# Patient Record
Sex: MALE | Race: BLACK OR AFRICAN AMERICAN | HISPANIC OR LATINO | Employment: PART TIME | URBAN - METROPOLITAN AREA
[De-identification: names, ages, dates, MRNs, and addresses within clinical notes are randomized per-mention and may not be internally consistent; named-entity substitution may affect disease eponyms.]

---

## 2017-03-03 ENCOUNTER — HOSPITAL ENCOUNTER (EMERGENCY)
Facility: HOSPITAL | Age: 37
Discharge: HOME | End: 2017-03-03
Attending: EMERGENCY MEDICINE
Payer: MEDICAID

## 2017-03-03 VITALS
HEIGHT: 66 IN | OXYGEN SATURATION: 100 % | BODY MASS INDEX: 24.11 KG/M2 | WEIGHT: 150 LBS | SYSTOLIC BLOOD PRESSURE: 183 MMHG | DIASTOLIC BLOOD PRESSURE: 124 MMHG | TEMPERATURE: 98 F | RESPIRATION RATE: 14 BRPM | HEART RATE: 72 BPM

## 2017-03-03 DIAGNOSIS — I10 ESSENTIAL HYPERTENSION: ICD-10-CM

## 2017-03-03 DIAGNOSIS — H60.91 OTITIS EXTERNA OF RIGHT EAR, UNSPECIFIED CHRONICITY, UNSPECIFIED TYPE: ICD-10-CM

## 2017-03-03 DIAGNOSIS — H66.91 RIGHT OTITIS MEDIA, UNSPECIFIED CHRONICITY, UNSPECIFIED OTITIS MEDIA TYPE: Primary | ICD-10-CM

## 2017-03-03 PROCEDURE — 99283 EMERGENCY DEPT VISIT LOW MDM: CPT

## 2017-03-03 RX ORDER — NEOMYCIN SULFATE, POLYMYXIN B SULFATE AND HYDROCORTISONE 10; 3.5; 1 MG/ML; MG/ML; [USP'U]/ML
4 SUSPENSION/ DROPS AURICULAR (OTIC) 3 TIMES DAILY
Qty: 8 ML | Refills: 0 | Status: SHIPPED | OUTPATIENT
Start: 2017-03-03 | End: 2017-03-13

## 2017-03-03 RX ORDER — HYDROCHLOROTHIAZIDE 12.5 MG/1
12.5 TABLET ORAL DAILY
Qty: 30 TABLET | Refills: 0 | Status: SHIPPED | OUTPATIENT
Start: 2017-03-03 | End: 2017-04-22

## 2017-03-03 RX ORDER — AMOXICILLIN AND CLAVULANATE POTASSIUM 875; 125 MG/1; MG/1
1 TABLET, FILM COATED ORAL 2 TIMES DAILY
Qty: 20 TABLET | Refills: 0 | Status: SHIPPED | OUTPATIENT
Start: 2017-03-03 | End: 2017-03-13

## 2017-03-03 NOTE — ED PROVIDER NOTES
"Encounter Date: 3/3/2017    SCRIBE #1 NOTE: I, Matthew Brizuela, am scribing for, and in the presence of,  Daniel Churchill MD. I have scribed the following portions of the note - Other sections scribed: HPI and ROS.       History     Chief Complaint   Patient presents with    Otalgia     PT C/O R EARACHE SINCE LAST NIGHT, STATES SOME GREEN/BROWN DRAINAGE LAST NIGHT.      Review of patient's allergies indicates:  No Known Allergies  HPI Comments: CC: Otalgia     HPI: This 36 y.o M with HTN presents to the ED c/o acute onset of constant and moderate (5/10) R otalgia which began last night. The pt also reports R ear drainage. The pt describes his R ear pain as "thumping." Pt denies headache, fever, and chills. The pt has not taken his HTN Rx this AM. No prior tx.     The history is provided by the patient. No  was used.     Past Medical History:   Diagnosis Date    Hypertension      Past Surgical History:   Procedure Laterality Date    ANKLE SURGERY      TYMPANOSTOMY TUBE PLACEMENT       Family History   Problem Relation Age of Onset    Asthma Mother     Diabetes Father      Social History   Substance Use Topics    Smoking status: Current Some Day Smoker     Types: Cigarettes    Smokeless tobacco: None    Alcohol use Yes      Comment: sometimes     Review of Systems   Constitutional: Negative for chills and fever.   HENT: Positive for ear discharge (R) and ear pain (R).    Respiratory: Negative for cough.    Cardiovascular: Negative for chest pain.   Gastrointestinal: Negative for diarrhea, nausea and vomiting.   Musculoskeletal: Negative for myalgias.   Skin: Negative for rash.   Neurological: Negative for headaches.       Physical Exam   Initial Vitals   BP Pulse Resp Temp SpO2   03/03/17 0612 03/03/17 0612 03/03/17 0612 03/03/17 0612 03/03/17 0612   203/131 70 13 98.2 °F (36.8 °C) 99 %     Physical Exam    Nursing note and vitals reviewed.  Constitutional: He appears well-developed and " well-nourished.   HENT:   Head: Normocephalic and atraumatic.   Erythematous, bulging right tympanic membrane with associated discharge in the external ear canal.   Eyes: EOM are normal. Pupils are equal, round, and reactive to light.   Cardiovascular: Normal rate, regular rhythm, normal heart sounds and intact distal pulses.   Pulmonary/Chest: Breath sounds normal. No respiratory distress. He has no wheezes. He has no rhonchi. He has no rales. He exhibits no tenderness.   Abdominal: Soft. Bowel sounds are normal. He exhibits no distension. There is no tenderness.   Musculoskeletal: Normal range of motion. He exhibits no edema.   Neurological: He is alert and oriented to person, place, and time.   Skin: Skin is warm and dry.         ED Course   Procedures  Labs Reviewed - No data to display            A decision was made to obtain the patient's old medical records.  If they were available, these records were reviewed.  Significant findings include previous evaluations for similar symptoms.  The patient does not have signs or symptoms to suggest acute hypertensive emergency with require emergent lowering of his blood pressure.  He reports noncompliance with his medications.  Exam findings are consistent with otitis media with associated otitis externa.  Discharge with Cortisporin, Augmentin, refill of blood pressure medications.  Primary care follow-up.          Scribe Attestation:   Scribe #1: I performed the above scribed service and the documentation accurately describes the services I performed. I attest to the accuracy of the note.    Attending Attestation:           Physician Attestation for Scribe:  Physician Attestation Statement for Scribe #1: I, Daniel Churchill MD, reviewed documentation, as scribed by Matthew Brizuela in my presence, and it is both accurate and complete.                 ED Course     Clinical Impression:   The primary encounter diagnosis was Right otitis media, unspecified chronicity,  unspecified otitis media type. Diagnoses of Otitis externa of right ear, unspecified chronicity, unspecified type and Essential hypertension were also pertinent to this visit.          Daniel Churchill MD  03/03/17 0610

## 2017-03-03 NOTE — DISCHARGE INSTRUCTIONS
Understanding Middle Ear Infections in Children  Middle ear infections are most common in children under age 5. Crankiness, a fever, and tugging at or rubbing the ear may all be signs that your child has a middle ear infection. This is especially true if your child has a cold or other viral illness. It's important to call your healthcare provider if you see these or any of the signs listed below.  Call your healthcare provider's office if you notice any signs of a middle ear infection.   What are middle ear infections?    Middle ear infections occur behind the eardrum. The eardrum is the thin sheet of tissue that passes sound waves between the outer and middle ear. These infections are usually caused by bacteria or viruses. These are often related to a recent cold or allergy problem.  A blocked tube  In young children, these bacteria or viruses likely reach the middle ear by traveling the short length of the eustachian tube from the back of the nose. Once in the middle ear, they multiply and spread. This irritates delicate tissues lining the middle ear and eustachian tube. If the tube lining swells enough to block off the tube, air pressure drops in the middle ear. This pulls the eardrum inward, making it stiffer and less able to transmit sound.  Fluid buildup causes pain  Once the eustachian tube swells shut, moisture cant drain from the middle ear. Fluid that should flush out the infection builds up in the chamber. This may raise pressure behind the eardrum. This can decrease pain slightly. But if the infection spreads to this fluid, pressure behind the eardrum goes way up. The eardrum is forced outward. It becomes painful, and may break.  Chronic fluid affects hearing  If the eardrum doesnt break and the tube remains blocked, the fluid becomes an ongoing condition (chronic). As the immediate (acute) infection passes, the middle ear fluid thickens. It becomes sticky and takes up less space. Pressure drops in  the middle ear once more. Inward suction stiffens the eardrum. This affects hearing. If the fluid is not removed, the eardrum may be stretched and damaged.  Signs of middle ear problems  · A temperature over 100.4°F (38.0°C) and cold symptoms  · Severe ear pain  · Any kind of discharge from the ear  · Ear pain that gets worse or doesnt go away after a few days   When to call your child's healthcare provider  Call your child's healthcare provider's office if your otherwise healthy child has any of the signs or symptoms described below:  · In an infant under 3 months old, a rectal temperature of 100.4°F (38.0°C) or higher  · In a child of any age who has a repeated temperature of 104°F (40°C) or higher  · A fever that lasts more than 24 hours in a child under 2 years old, or for 3 days in a child 2 years or older  · Your child has had a seizure caused by the fever  · Rapid breathing or shortness of breath  · A stiff neck or headache  · Difficulty swallowing  · Your child acts ill after the fever is gone  · Persistent brown, green, or bloody mucus  · Signs of dehydration. These include severe thirst, dark yellow urine, infrequent urination, dull or sunken eyes, dry skin, and dry or cracked lips.  · Your child still doesn't look or act right to you, even after taking a non-aspirin pain reliever  Date Last Reviewed: 11/1/2016  © 1624-9387 [employer]. [address] [place] [address], [place] [address]. All rights reserved. This information is not intended as a substitute for professional medical care. Always follow your healthcare professional's instructions.

## 2017-03-03 NOTE — ED TRIAGE NOTES
PT C/O RIGHT EARACHE SINCE LAST NIGHT. INCIDENTALLY HAS KG=246/131, STATES HAS NOT TAKEN HIS HCTZ X1WEEK BECAUSE HE NEEDS RX REFILLED.

## 2017-03-03 NOTE — ED AVS SNAPSHOT
[facility]  [place] [id number]               Rodolfo Aamir   3/3/2017  6:16 AM   ED    Description:  Male : 1980   Department:  [facility]           Your Care was Coordinated By:     Provider Role From To    Daniel Churchill MD Attending Provider 17 0618 --      Reason for Visit     Otalgia           Diagnoses this Visit        Comments    Right otitis media, unspecified chronicity, unspecified otitis media type    -  Primary     Otitis externa of right ear, unspecified chronicity, unspecified type         Essential hypertension           ED Disposition     None           To Do List           Follow-up Information     Follow up with Maribell Belcher MD. Schedule an appointment as soon as possible for a visit in 1 week.    Specialty:  Internal Medicine    Why:  As needed    Contact information:    [address] [place] [place] [id number] [phone]         These Medications        Disp Refills Start End    amoxicillin-clavulanate 875-125mg (AUGMENTIN) 875-125 mg per tablet 20 tablet 0 3/3/2017 3/13/2017    Take 1 tablet by mouth 2 (two) times daily. - Oral    neomycin-polymyxin-hydrocortisone (CORTISPORIN) 3.5-10,000-1 mg/mL-unit/mL-% otic suspension 8 mL 0 3/3/2017 3/13/2017    Place 4 drops into the right ear 3 (three) times daily. - Right Ear      [facility]     [facility] On Call Nurse Care Line -  Assistance  Registered nurses in the [facility] On Call Center provide clinical advisement, health education, appointment booking, and other advisory services.  Call for this free service at [phone].             Medications           Message regarding Medications     Verify the changes and/or additions to your medication regime listed below are the same as discussed with your clinician today.  If any of these changes or additions are incorrect, please notify your healthcare provider.        START taking these NEW  "medications        Refills    amoxicillin-clavulanate 875-125mg (AUGMENTIN) 875-125 mg per tablet 0    Sig: Take 1 tablet by mouth 2 (two) times daily.    Class: Print    Route: Oral    neomycin-polymyxin-hydrocortisone (CORTISPORIN) 3.5-10,000-1 mg/mL-unit/mL-% otic suspension 0    Sig: Place 4 drops into the right ear 3 (three) times daily.    Class: Print    Route: Right Ear           Verify that the below list of medications is an accurate representation of the medications you are currently taking.  If none reported, the list may be blank. If incorrect, please contact your healthcare provider. Carry this list with you in case of emergency.           Current Medications     hydrochlorothiazide (HYDRODIURIL) 12.5 MG Tab Take 1 tablet (12.5 mg total) by mouth once daily.    amoxicillin-clavulanate 875-125mg (AUGMENTIN) 875-125 mg per tablet Take 1 tablet by mouth 2 (two) times daily.    hydrocodone-acetaminophen 5-325mg (NORCO) 5-325 mg per tablet Take 1 tablet by mouth every 6 (six) hours as needed for Pain.    naproxen (NAPROSYN) 500 MG tablet Take 1 tablet (500 mg total) by mouth 2 (two) times daily with meals.    neomycin-polymyxin-hydrocortisone (CORTISPORIN) 3.5-10,000-1 mg/mL-unit/mL-% otic suspension Place 4 drops into the right ear 3 (three) times daily.           Clinical Reference Information           Your Vitals Were     BP Pulse Temp Resp Height Weight    183/124 70 98.2 °F (36.8 °C) (Oral) 13 5' 6" (1.676 m) 68 kg (150 lb)    SpO2 BMI             99% 24.21 kg/m2         Allergies as of 3/3/2017     No Known Allergies      Immunizations Administered on Date of Encounter - 3/3/2017     None      ED Micro, Lab, POCT     None      ED Imaging Orders     None        Discharge Instructions         Understanding Middle Ear Infections in Children  Middle ear infections are most common in children under age 5. Crankiness, a fever, and tugging at or rubbing the ear may all be signs that your child has a middle " ear infection. This is especially true if your child has a cold or other viral illness. It's important to call your healthcare provider if you see these or any of the signs listed below.  Call your healthcare provider's office if you notice any signs of a middle ear infection.   What are middle ear infections?    Middle ear infections occur behind the eardrum. The eardrum is the thin sheet of tissue that passes sound waves between the outer and middle ear. These infections are usually caused by bacteria or viruses. These are often related to a recent cold or allergy problem.  A blocked tube  In young children, these bacteria or viruses likely reach the middle ear by traveling the short length of the eustachian tube from the back of the nose. Once in the middle ear, they multiply and spread. This irritates delicate tissues lining the middle ear and eustachian tube. If the tube lining swells enough to block off the tube, air pressure drops in the middle ear. This pulls the eardrum inward, making it stiffer and less able to transmit sound.  Fluid buildup causes pain  Once the eustachian tube swells shut, moisture cant drain from the middle ear. Fluid that should flush out the infection builds up in the chamber. This may raise pressure behind the eardrum. This can decrease pain slightly. But if the infection spreads to this fluid, pressure behind the eardrum goes way up. The eardrum is forced outward. It becomes painful, and may break.  Chronic fluid affects hearing  If the eardrum doesnt break and the tube remains blocked, the fluid becomes an ongoing condition (chronic). As the immediate (acute) infection passes, the middle ear fluid thickens. It becomes sticky and takes up less space. Pressure drops in the middle ear once more. Inward suction stiffens the eardrum. This affects hearing. If the fluid is not removed, the eardrum may be stretched and damaged.  Signs of middle ear problems  · A temperature over  100.4°F (38.0°C) and cold symptoms  · Severe ear pain  · Any kind of discharge from the ear  · Ear pain that gets worse or doesnt go away after a few days   When to call your child's healthcare provider  Call your child's healthcare provider's office if your otherwise healthy child has any of the signs or symptoms described below:  · In an infant under 3 months old, a rectal temperature of 100.4°F (38.0°C) or higher  · In a child of any age who has a repeated temperature of 104°F (40°C) or higher  · A fever that lasts more than 24 hours in a child under 2 years old, or for 3 days in a child 2 years or older  · Your child has had a seizure caused by the fever  · Rapid breathing or shortness of breath  · A stiff neck or headache  · Difficulty swallowing  · Your child acts ill after the fever is gone  · Persistent brown, green, or bloody mucus  · Signs of dehydration. These include severe thirst, dark yellow urine, infrequent urination, dull or sunken eyes, dry skin, and dry or cracked lips.  · Your child still doesn't look or act right to you, even after taking a non-aspirin pain reliever  Date Last Reviewed: 11/1/2016  © 6046-4974 [employer]. [address] [place] [address], [place] [address]. All rights reserved. This information is not intended as a substitute for professional medical care. Always follow your healthcare professional's instructions.          [facility] Sign-Up     Activating your [facility] account is as easy as 1-2-3!     1) Visit [email or web address], select Sign Up Now, enter this activation code and your date of birth, then select Next.  [email or web address]  Expires: 4/17/2017  6:24 AM      2) Create a username and password to use when you visit [facility] in the future and select a security question in case you lose your password and select Next.    3) Enter your e-mail address and click Sign Up!    Additional Information  If you have questions, please e-mail [email or web address] or call  [phone] to talk to our [facility] staff. Remember, [facility] is NOT to be used for urgent needs. For medical emergencies, dial 911.          [facility] complies with applicable Federal civil rights laws and does not discriminate on the basis of race, color, national origin, age, disability, or sex.        Language Assistance Services     ATTENTION: Language assistance services are available, free of charge. Please call [phone].      ATENCIÓN: Si habla jodi, tiene a le disposición servicios gratuitos de asistencia lingüística. Llame al [phone].     CHÚ Ý: N?u b?n nói Ti?ng Vi?t, có các d?ch v? h? tr? ngôn ng? mi?n phí dành cho b?n. G?i s? [phone].

## 2017-03-25 ENCOUNTER — HOSPITAL ENCOUNTER (EMERGENCY)
Facility: HOSPITAL | Age: 37
Discharge: HOME | End: 2017-03-25
Attending: EMERGENCY MEDICINE
Payer: MEDICAID

## 2017-03-25 VITALS
DIASTOLIC BLOOD PRESSURE: 127 MMHG | TEMPERATURE: 99 F | BODY MASS INDEX: 24.11 KG/M2 | WEIGHT: 150 LBS | HEART RATE: 106 BPM | SYSTOLIC BLOOD PRESSURE: 184 MMHG | RESPIRATION RATE: 16 BRPM | OXYGEN SATURATION: 99 % | HEIGHT: 66 IN

## 2017-03-25 DIAGNOSIS — H92.01 EARACHE ON RIGHT: Primary | ICD-10-CM

## 2017-03-25 DIAGNOSIS — I15.9 SECONDARY HYPERTENSION: ICD-10-CM

## 2017-03-25 PROCEDURE — 99283 EMERGENCY DEPT VISIT LOW MDM: CPT

## 2017-03-25 RX ORDER — IBUPROFEN 400 MG/1
400 TABLET ORAL
Status: DISCONTINUED | OUTPATIENT
Start: 2017-03-25 | End: 2017-03-25 | Stop reason: HOSPADM

## 2017-03-25 NOTE — ED AVS SNAPSHOT
[facility]  [address] [place] [address]               Rodolfo Olivasiago   3/25/2017  7:29 PM   ED    Description:  Male : 1980   Department:  [facility]           Your Care was Coordinated By:     Provider Role From To    Toby Bowen MD Attending Provider 17 1944 --      Reason for Visit     Otalgia           Diagnoses this Visit        Comments    Earache on right    -  Primary     Secondary hypertension           ED Disposition     ED Disposition Condition Comment    Discharge  Our goal in the emergency department is to always give you outstanding care and exceptional service. You may receive a survey by mail or e-mail in the next week regarding your experience in our ED. We would greatly appreciate your completing and returnin g the survey. Your feedback provides us with a way to recognize our staff who give very good care and it helps us learn how to improve when your experience was below our aspiration of excellence.              To Do List           Follow-up Information     Follow up with Zaid Bridges MD.    Specialty:  Family Medicine    Why:  As needed    Contact information:    [address] [facility] [address]  [phone]          Follow up with [facility].    Specialty:  Otolaryngology    Why:  FOR SPECIALTY EVALUATION    Contact information:    [address] [facility]  [address]  [place] [address]  [phone]          Schedule an appointment as soon as possible for a visit with Cuate Womack - Otorhinolaryngology.    Specialty:  Otolaryngology    Contact information:    [address]  [facility] [address] [phone]    Additional information:    [facility] - 4th Floor      [facility] On Call     [facility] On Call Nurse Care Line -  Assistance  Registered nurses in the [facility] On Call Center provide clinical advisement, health education, appointment booking, and other advisory  "services.  Call for this free service at [phone].             Medications           Message regarding Medications     Verify the changes and/or additions to your medication regime listed below are the same as discussed with your clinician today.  If any of these changes or additions are incorrect, please notify your healthcare provider.        These medications were administered today        Dose Freq    ibuprofen tablet 400 mg 400 mg ED 1 Time    Sig: Take 1 tablet (400 mg total) by mouth ED 1 Time.    Class: Normal    Route: Oral           Verify that the below list of medications is an accurate representation of the medications you are currently taking.  If none reported, the list may be blank. If incorrect, please contact your healthcare provider. Carry this list with you in case of emergency.           Current Medications     hydrochlorothiazide (HYDRODIURIL) 12.5 MG Tab Take 1 tablet (12.5 mg total) by mouth once daily.    hydrocodone-acetaminophen 5-325mg (NORCO) 5-325 mg per tablet Take 1 tablet by mouth every 6 (six) hours as needed for Pain.    ibuprofen tablet 400 mg Take 1 tablet (400 mg total) by mouth ED 1 Time.    naproxen (NAPROSYN) 500 MG tablet Take 1 tablet (500 mg total) by mouth 2 (two) times daily with meals.           Clinical Reference Information           Your Vitals Were     BP Pulse Temp Resp Height Weight    184/127 106 98.6 °F (37 °C) (Oral) 16 5' 6" (1.676 m) 68 kg (150 lb)    SpO2 BMI             99% 24.21 kg/m2         Allergies as of 3/25/2017     No Known Allergies      Immunizations Administered on Date of Encounter - 3/25/2017     None      ED Micro, Lab, POCT     Start Ordered       Status Ordering Provider    03/25/17 1932 03/25/17 1932    STAT,   Status:  Canceled      Canceled     03/25/17 1932 03/25/17 1932    STAT,   Status:  Canceled      Canceled       ED Imaging Orders     None        Discharge Instructions         Take your high blood pressure medicine as soon as " you get home.    Earache, No Infection (Adult)  Earaches can happen without an infection. This occurs when air and fluid build up behind the eardrum causing a feeling of fullness and discomfort and reduced hearing. This is called otitis media with effusion (OME) or serous otitis media. It means there is fluid in the middle ear. It is not the same as acute otitis media, which is typically from infection.  OME can happen when you have a cold if congestion blocks the passage that drains the middle ear. This passage is called the eustachian tube. OME may also occur with nasal allergies or after a bacterial middle ear infection.    The pain or discomfort may come and go. You may hear clicking or popping sounds when you chew or swallow. You may feel that your balance is off. Or you may hear ringing in the ear.  It often takes from several weeks up to 3 months for the fluid to clear on its own. Oral pain relievers and ear drops help if there is pain. Decongestants and antihistamines sometimes help. Antibiotics don't help since there is no infection. Your doctor may prescribe a nasal spray to help reduce swelling in the nose and eustachian tube. This can allow the ear to drain.  If your OME doesn't improve after 3 months, surgery may be used to drain the fluid and insert a small tube in the eardrum to allow continued drainage.  Because the middle ear fluid can become infected, it is important to watch for signs of an ear infection which may develop later. These signs include increased ear pain, fever, or drainage from the ear.  Home care  The following guidelines will help you care for yourself at home:  · You may use over-the-counter medicine as directed to control pain, unless another medicine was prescribed. If you have chronic liver or kidney disease or ever had a stomach ulcer or GI bleeding, talk with your doctor before using these medicines. Aspirin should never be used in anyone under 18 years of age who is ill with  a fever. It may cause severe liver damage.  · You may use over-the-counter decongestants such as phenylephrine or pseudoephedrine. But they are not always helpful. Don't use nasal spray decongestants more than 3 days. Longer use can make congestion worse. Prescription nasal sprays from your doctor don't typically have those restrictions.  · Antihistamines may help if you are also having allergy symptoms.  · You may use medicines such as guaifenesin to thin mucus and promote drainage.  Follow-up care  Follow up with your healthcare provider or as advised if you are not feeling better after 3 days.  When to seek medical advice  Call your healthcare provider right away if any of the following occur:  · Your ear pain gets worse or does not start to improve   · Fever of 100.4°F (38°C) or higher, or as directed by your healthcare provider  · Fluid or blood draining from the ear  · Headache or sinus pain  · Stiff neck  · Unusual drowsiness or confusion  Date Last Reviewed: 10/1/2016  © 4242-6881 [employer]. [address] [place] [address]. All rights reserved. This information is not intended as a substitute for professional medical care. Always follow your healthcare professional's instructions.          [facility] Sign-Up     Activating your [facility] account is as easy as 1-2-3!     1) Visit [email or web address], select Sign Up Now, enter this activation code and your date of birth, then select Next.  [email or web address]  Expires: 4/17/2017  7:24 AM      2) Create a username and password to use when you visit [facility] in the future and select a security question in case you lose your password and select Next.    3) Enter your e-mail address and click Sign Up!    Additional Information  If you have questions, please e-mail [email or web address] or call [phone] to talk to our [facility] staff. Remember, [facility] is NOT to be used for urgent needs. For medical emergencies, dial 911.         Smoking  Cessation     If you would like to quit smoking:   You may be eligible for free services if you are a [place] resident and started smoking cigarettes before September 1, 1988.  Call the Smoking Cessation Trust (SCT) toll free at (063) 765-2604 or (986) 795-9023.   Call 0-800-QUIT-NOW if you do not meet the above criteria.             [facility] complies with applicable Federal civil rights laws and does not discriminate on the basis of race, color, national origin, age, disability, or sex.        Language Assistance Services     ATTENTION: Language assistance services are available, free of charge. Please call [phone].      ATENCIÓN: Si habla español, tiene a le disposición servicios gratuitos de asistencia lingüística. Llame al [phone].     CHÚ Ý: N?u b?n nói Ti?ng Vi?t, có các d?ch v? h? tr? ngôn ng? mi?n phí dành cho b?n. G?i s? [phone].

## 2017-03-26 NOTE — DISCHARGE INSTRUCTIONS
Take your high blood pressure medicine as soon as you get home.    Earache, No Infection (Adult)  Earaches can happen without an infection. This occurs when air and fluid build up behind the eardrum causing a feeling of fullness and discomfort and reduced hearing. This is called otitis media with effusion (OME) or serous otitis media. It means there is fluid in the middle ear. It is not the same as acute otitis media, which is typically from infection.  OME can happen when you have a cold if congestion blocks the passage that drains the middle ear. This passage is called the eustachian tube. OME may also occur with nasal allergies or after a bacterial middle ear infection.    The pain or discomfort may come and go. You may hear clicking or popping sounds when you chew or swallow. You may feel that your balance is off. Or you may hear ringing in the ear.  It often takes from several weeks up to 3 months for the fluid to clear on its own. Oral pain relievers and ear drops help if there is pain. Decongestants and antihistamines sometimes help. Antibiotics don't help since there is no infection. Your doctor may prescribe a nasal spray to help reduce swelling in the nose and eustachian tube. This can allow the ear to drain.  If your OME doesn't improve after 3 months, surgery may be used to drain the fluid and insert a small tube in the eardrum to allow continued drainage.  Because the middle ear fluid can become infected, it is important to watch for signs of an ear infection which may develop later. These signs include increased ear pain, fever, or drainage from the ear.  Home care  The following guidelines will help you care for yourself at home:  · You may use over-the-counter medicine as directed to control pain, unless another medicine was prescribed. If you have chronic liver or kidney disease or ever had a stomach ulcer or GI bleeding, talk with your doctor before using these medicines. Aspirin should never be  used in anyone under 18 years of age who is ill with a fever. It may cause severe liver damage.  · You may use over-the-counter decongestants such as phenylephrine or pseudoephedrine. But they are not always helpful. Don't use nasal spray decongestants more than 3 days. Longer use can make congestion worse. Prescription nasal sprays from your doctor don't typically have those restrictions.  · Antihistamines may help if you are also having allergy symptoms.  · You may use medicines such as guaifenesin to thin mucus and promote drainage.  Follow-up care  Follow up with your healthcare provider or as advised if you are not feeling better after 3 days.  When to seek medical advice  Call your healthcare provider right away if any of the following occur:  · Your ear pain gets worse or does not start to improve   · Fever of 100.4°F (38°C) or higher, or as directed by your healthcare provider  · Fluid or blood draining from the ear  · Headache or sinus pain  · Stiff neck  · Unusual drowsiness or confusion  Date Last Reviewed: 10/1/2016  © 2958-1955 [employer]. [address] [place] [address], [place] [address]. All rights reserved. This information is not intended as a substitute for professional medical care. Always follow your healthcare professional's instructions.

## 2017-03-26 NOTE — ED PROVIDER NOTES
Encounter Date: 3/25/2017    SCRIBE #1 NOTE: I, Alex Bains FRANCHESCA, am scribing for, and in the presence of,  Toby Bowen MD. I have scribed the following portions of the note - Other sections scribed: HPI, ROS, PE, MDM.       History     Chief Complaint   Patient presents with    Otalgia     Pt presents with right sided ear pain that began today.      Review of patient's allergies indicates:  No Known Allergies  HPI Comments: CC: Otalgia     HPI: This 37 y.o. Male smoker with HTN  presents to the ED c/o acute onset, severe (8/10) right ear pain that began today pta. Pt states that sometimes his right ear does experience drainage. Pt states he had ear surgery on the same ear over 20 years ago. No prior tx has been attempted. Pt denies dental pain, SOB, and n/v/d.     SHx: tympanostomy tube placement, ankle surgery      The patient did not take his blood pressure medication today.    The history is provided by the patient. No  was used.     Past Medical History:   Diagnosis Date    Hypertension      Past Surgical History:   Procedure Laterality Date    ANKLE SURGERY      TYMPANOSTOMY TUBE PLACEMENT       Family History   Problem Relation Age of Onset    Asthma Mother     Diabetes Father      Social History   Substance Use Topics    Smoking status: Current Some Day Smoker     Types: Cigarettes    Smokeless tobacco: None    Alcohol use Yes      Comment: sometimes     Review of Systems   Constitutional: Negative for chills, diaphoresis and fever.   HENT: Positive for ear pain (right). Negative for sore throat.    Eyes: Negative for pain.        (-) eye problems   Respiratory: Negative for cough and shortness of breath.    Cardiovascular: Negative for chest pain.   Gastrointestinal: Negative for abdominal pain, diarrhea, nausea and vomiting.   Genitourinary: Negative for dysuria.   Musculoskeletal: Negative for back pain.        (-) arm or leg pain   Skin: Negative for rash.   Neurological:  Negative for headaches.       Physical Exam   Initial Vitals   BP Pulse Resp Temp SpO2   03/25/17 1850 03/25/17 1850 03/25/17 1850 03/25/17 1850 03/25/17 1850   193/132 106 16 98.6 °F (37 °C) 99 %     Physical Exam    Nursing note and vitals reviewed.  Constitutional: Vital signs are normal. He appears well-developed and well-nourished. He is active.  Non-toxic appearance. No distress.   HENT:   Head: Normocephalic and atraumatic.   Eyes: EOM are normal.   Neck: Trachea normal. Neck supple.   Cardiovascular: Normal rate and regular rhythm.   Pulmonary/Chest: Breath sounds normal. No respiratory distress.   Abdominal: Soft. Normal appearance and bowel sounds are normal. He exhibits no distension. There is no tenderness.   Musculoskeletal: Normal range of motion. He exhibits no edema.   Neurological: He is alert.   Skin: Skin is warm, dry and intact.   Psychiatric: He has a normal mood and affect.         ED Course   Procedures  Labs Reviewed - No data to display          Medical Decision Making:   History:   Old Medical Records: I decided to obtain old medical records.  Initial Assessment:   Earache and hypertension  Differential Diagnosis:   Otitis media, otitis externa, foreign body  ED Management:  This was an evaluation of a 37 her male complaining of right-sided otalgia.  Symptoms began yesterday.  He had a surgery on the affected ear as a child.  He experiences intermittent drainage but has not had any recently and does not have any now.  He denies any fever or chills.  There is no dental pain.  On exam, his vital signs are stable.  The left ear is normal without evidence of infection or abnormality.  The right ear has postsurgical changes.  The the lining of the ear canal largely appears normal.  There is no active drainage, redness, or swelling.  He does not have lymphadenopathy.  There is no dental involvement.  The cause of his right ear pain is not clear at this time.  He will be treated with Motrin for  pain control.  He will be referred to ENT for evaluation of his ear since his postoperative findings confuse the exam.  There does not appear to be an infection or acute abnormality.  The patient is also hypertensive.  He has no symptoms.  He did not take his blood pressure medicine.  I offered to give him his daily dose of hydralazine 12.5 mg by mouth, the patient declined it would like to take his medication when he gets home.  A workup for hypertensive emergency is not indicated at this time since he has no symptoms.  He'll be discharged with supportive therapy for his ear pain.  He will be referred to ENT.                Scribe Attestation:   Scribe #1: I performed the above scribed service and the documentation accurately describes the services I performed. I attest to the accuracy of the note.    Attending Attestation:           Physician Attestation for Scribe:  Physician Attestation Statement for Scribe #1: I, Toby Bowen MD, reviewed documentation, as scribed by Alex Bains II in my presence, and it is both accurate and complete.                 ED Course     Clinical Impression:   The primary encounter diagnosis was Earache on right. A diagnosis of Secondary hypertension was also pertinent to this visit.    Disposition:   Disposition: Discharged  Condition: Stable       Toby Bowen MD  03/26/17 0256

## 2017-03-26 NOTE — ED TRIAGE NOTES
"  37 year old male arrives to the ED via personal transportation due to right ear pain that "goes in and out" since today at approx 12pm. Pt states it occurred when he was at work. Pt is a  and reports that there is frequency loud noises going on. No drainage noted. Reports having right ear pain previously a couple month ago and was given ear drops with no relief. Pain 8/10.    "

## 2017-03-26 NOTE — ED NOTES
"  Pt reports being noncompliant with BP medication because "i be forgetting."  Last dose of BP medication was taken "I think Wednesday." Pt educated on properly taking medication as prescribed and notified that BP is currently elevated.     "

## 2017-04-22 ENCOUNTER — HOSPITAL ENCOUNTER (EMERGENCY)
Facility: HOSPITAL | Age: 37
Discharge: HOME | End: 2017-04-22
Attending: EMERGENCY MEDICINE

## 2017-04-22 VITALS
TEMPERATURE: 98 F | HEART RATE: 110 BPM | HEIGHT: 66 IN | WEIGHT: 150 LBS | DIASTOLIC BLOOD PRESSURE: 89 MMHG | BODY MASS INDEX: 24.11 KG/M2 | SYSTOLIC BLOOD PRESSURE: 144 MMHG | OXYGEN SATURATION: 98 % | RESPIRATION RATE: 17 BRPM

## 2017-04-22 DIAGNOSIS — H92.01 OTALGIA OF RIGHT EAR: ICD-10-CM

## 2017-04-22 DIAGNOSIS — I10 ESSENTIAL HYPERTENSION: Primary | ICD-10-CM

## 2017-04-22 PROCEDURE — 99283 EMERGENCY DEPT VISIT LOW MDM: CPT

## 2017-04-22 RX ORDER — HYDROCHLOROTHIAZIDE 25 MG/1
12.5 TABLET ORAL DAILY
Qty: 30 TABLET | Refills: 0 | Status: SHIPPED | OUTPATIENT
Start: 2017-04-22 | End: 2021-05-06 | Stop reason: SDUPTHER

## 2017-04-22 NOTE — ED AVS SNAPSHOT
[facility]  [address] [place] [address]               Rodolfo Rutledge   2017 10:05 PM   ED    Description:  Male : 1980   Department:  [facility]           Your Care was Coordinated By:     Provider Role From To    Daniel Alvarado MD Attending Provider 17 --    Amanda Lamas NP Nurse Practitioner 17 --      Reason for Visit     Otalgia           Diagnoses this Visit        Comments    Essential hypertension    -  Primary     Otalgia of right ear           ED Disposition     ED Disposition Condition Comment    Discharge             To Do List           Follow-up Information     Schedule an appointment as soon as possible for a visit with [facility].    Specialty:  Otolaryngology    Why:  ENT    Contact information:    [address] [facility] [address]  [place] [address]  [phone]          Schedule an appointment as soon as possible for a visit with [facility] ENT.    Specialty:  Otolaryngology    Why:  ENT    Contact information:    [address]  [facility] [address]  [phone]       These Medications        Disp Refills Start End    hydrochlorothiazide (HYDRODIURIL) 25 MG tablet 30 tablet 0 2017    Take 0.5 tablets (12.5 mg total) by mouth once daily. - Oral      [facility]     [facility] On Call Nurse Care Line - 24/7 Assistance  Unless otherwise directed by your provider, please contact [facility] On-Call, our nurse care line that is available for 24/7 assistance.     Registered nurses in the [facility] provide: appointment scheduling, clinical advisement, health education, and other advisory services.  Call: [phone] (toll free)               Medications           Message regarding Medications     Verify the changes and/or additions to your medication regime listed below are the same as discussed with your clinician today.  If any of these changes  "or additions are incorrect, please notify your healthcare provider.        START taking these NEW medications        Refills    hydrochlorothiazide (HYDRODIURIL) 25 MG tablet 0    Sig: Take 0.5 tablets (12.5 mg total) by mouth once daily.    Class: Print    Route: Oral      STOP taking these medications     hydrocodone-acetaminophen 5-325mg (NORCO) 5-325 mg per tablet Take 1 tablet by mouth every 6 (six) hours as needed for Pain.    naproxen (NAPROSYN) 500 MG tablet Take 1 tablet (500 mg total) by mouth 2 (two) times daily with meals.           Verify that the below list of medications is an accurate representation of the medications you are currently taking.  If none reported, the list may be blank. If incorrect, please contact your healthcare provider. Carry this list with you in case of emergency.           Current Medications     hydrochlorothiazide (HYDRODIURIL) 25 MG tablet Take 0.5 tablets (12.5 mg total) by mouth once daily.           Clinical Reference Information           Your Vitals Were     BP Pulse Temp Resp Height Weight    144/89 (BP Location: Left arm, Patient Position: Sitting) 110 97.7 °F (36.5 °C) (Oral) 17 5' 6" (1.676 m) 68 kg (150 lb)    SpO2 BMI             98% 24.21 kg/m2         Allergies as of 4/22/2017     No Known Allergies      Immunizations Administered on Date of Encounter - 4/22/2017     None      ED Micro, Lab, POCT     None      ED Imaging Orders     None        Discharge Instructions       Please return to the ED for any new or worsening symptoms: chest pain, shortness of breath, loss of consciousness or any other concerns. Please follow up with ENT within in the week. You may also call [phone] for the [facility] same day appointment line.    You may take over the counter Tylenol or ibuprofen as needed for pain.    Discharge References/Attachments     EARACHE WITHOUT INFECTION (ADULT) (ENGLISH)    HIGH BLOOD PRESSURE (HYPERTENSION), DISCHARGE INSTRUCTIONS (ENGLISH)    "   [facility] Sign-Up     Activating your [facility] account is as easy as 1-2-3!     1) Visit [email or web address], select Sign Up Now, enter this activation code and your date of birth, then select Next.  [email or web address]  Expires: 6/6/2017 10:25 PM      2) Create a username and password to use when you visit [facility] in the future and select a security question in case you lose your password and select Next.    3) Enter your e-mail address and click Sign Up!    Additional Information  If you have questions, please e-mail [email or web address] or call [phone] to talk to our [facility] staff. Remember, [facility] is NOT to be used for urgent needs. For medical emergencies, dial 911.          [facility] complies with applicable Federal civil rights laws and does not discriminate on the basis of race, color, national origin, age, disability, or sex.        Language Assistance Services     ATTENTION: Language assistance services are available, free of charge. Please call [phone].      ATENCIÓN: Si habla español, tiene a le disposición servicios gratuitos de asistencia lingüística. Llame al [phone].     CHÚ Ý: N?u b?n nói Ti?ng Vi?t, có các d?ch v? h? tr? ngôn ng? mi?n phí dành cho b?n. G?i s? [phone].

## 2017-04-23 NOTE — DISCHARGE INSTRUCTIONS
Please return to the ED for any new or worsening symptoms: chest pain, shortness of breath, loss of consciousness or any other concerns. Please follow up with ENT within in the week. You may also call [phone] for the [facility] same day appointment line.    You may take over the counter Tylenol or ibuprofen as needed for pain.

## 2017-04-23 NOTE — ED PROVIDER NOTES
Encounter Date: 4/22/2017    SCRIBE #1 NOTE: I, Jimmy Bustamante , am scribing for, and in the presence of,  Amanda Lamas NP . I have scribed the following portions of the note - Other sections scribed: HPI/ROS .       History     Chief Complaint   Patient presents with    Otalgia     right. x2 days     Review of patient's allergies indicates:  No Known Allergies  HPI Comments: CC: Ear Pain     HPI: This 37 y.o. male with HTN presents to the ED c/o a 2-day hx of acute-onset, moderate (7/10), intermittent, sharp R sided ear pain that occurs when he is lying down.He reports having had 2 surgeries to the affected ear as a child. Pt states his current ear pain is consistent with episodes of ear pain for which he was evaluated for during past ED visits. No prior attempted pain tx. Pt states he has not yet followed up with ENT because he needs a referral from his PCP. Pt otherwise denies fever, neck pain, ear discharge, HA, sore throat, N/V, or any other associated symptoms.       The history is provided by the patient. No  was used.     Past Medical History:   Diagnosis Date    Hypertension      Past Surgical History:   Procedure Laterality Date    ANKLE SURGERY      TYMPANOSTOMY TUBE PLACEMENT       Family History   Problem Relation Age of Onset    Asthma Mother     Diabetes Father      Social History   Substance Use Topics    Smoking status: Current Some Day Smoker     Types: Cigarettes    Smokeless tobacco: None    Alcohol use Yes      Comment: sometimes     Review of Systems   Constitutional: Negative for chills and fever.   HENT: Positive for ear pain (R ear ). Negative for ear discharge and sore throat.    Eyes: Negative for pain and visual disturbance.   Respiratory: Negative for cough and shortness of breath.    Cardiovascular: Negative for chest pain.   Gastrointestinal: Negative for abdominal pain, diarrhea, nausea and vomiting.   Genitourinary: Negative for difficulty  urinating and dysuria.   Musculoskeletal: Negative for back pain and neck pain.   Skin: Negative for rash.   Neurological: Negative for weakness, numbness and headaches.       Physical Exam   Initial Vitals   BP Pulse Resp Temp SpO2   04/22/17 2201 04/22/17 2201 04/22/17 2201 04/22/17 2201 04/22/17 2201   144/89 110 17 97.7 °F (36.5 °C) 98 %     Physical Exam    Constitutional: Vital signs are normal. He appears well-developed and well-nourished.  Non-toxic appearance.   HENT:   Head: Normocephalic and atraumatic.   Right Ear: Ear canal normal.   Left Ear: Tympanic membrane and ear canal normal.   Left TM shows surgical changes with old scarring and likely granulation tissue; no ereythem, bulging or drainage   Eyes: EOM are normal.   Neck: Full passive range of motion without pain. Neck supple. No rigidity.   Cardiovascular: Normal rate, S1 normal, S2 normal and normal heart sounds. Exam reveals no gallop.    No murmur heard.  Pulmonary/Chest: Effort normal and breath sounds normal. No tachypnea. He has no decreased breath sounds. He has no wheezes. He has no rhonchi. He has no rales.   Abdominal: Soft. Normal appearance. There is no tenderness. There is no CVA tenderness, no tenderness at McBurney's point and negative Rosado's sign.   Lymphadenopathy:     He has no cervical adenopathy.   Neurological: He is alert and oriented to person, place, and time. GCS eye subscore is 4. GCS verbal subscore is 5. GCS motor subscore is 6.   Skin: Skin is warm, dry and intact. No rash noted.   Psychiatric: He has a normal mood and affect.         ED Course   Procedures  Labs Reviewed - No data to display          Medical Decision Making:   ED Management:  This is a 37-year-old male who presents to the ED with complaints of chronic right ear pain.  He is afebrile and well-appearing.  Patient also requests a refill for his blood pressure medication.  Patient has presented this ED multiple times for ear pain.  He has had surgery to  the right eardrum as a child.  On exam, there are postsurgical changes.  No obvious evidence of infection.  No TM perforation or evidence of mastoiditis.  No indication for antibiotics today.  I will refill his HCTZ. Discharged home with prescription for HCTZ.  Instructions given for supportive care and follow-up with ENT.  Return precautions given.  Patient was also seen and evaluated by Dr. Alvarado, who agrees with her plan of care.            Scribe Attestation:   Scribe #1: I performed the above scribed service and the documentation accurately describes the services I performed. I attest to the accuracy of the note.    Attending Attestation:     Physician Attestation Statement for NP/PA:   I have conducted a face to face encounter with this patient in addition to the NP/PA, due to NP/PA Request    Other NP/PA Attestation Additions:      Medical Decision Makin-year-old male presenting with ear pain.   Had years surgery as a child.  Suspect findings in the ear are postoperative.  No evidence of infection.  I agree with plan.       Physician Attestation for Scribe:  Physician Attestation Statement for Scribe #1: I, Amanda Lamas NP, reviewed documentation, as scribed by Jimmy Bustamante  in my presence, and it is both accurate and complete.                 ED Course     Clinical Impression:   The primary encounter diagnosis was Essential hypertension. A diagnosis of Otalgia of right ear was also pertinent to this visit.    Disposition:   Disposition: Discharged  Condition: Stable       Amanda Lamas NP  [phone]       Daniel Alvarado MD  [phone]

## 2021-05-06 ENCOUNTER — HOSPITAL ENCOUNTER (EMERGENCY)
Facility: HOSPITAL | Age: 41
Discharge: HOME | End: 2021-05-06
Attending: STUDENT IN AN ORGANIZED HEALTH CARE EDUCATION/TRAINING PROGRAM
Payer: MEDICAID

## 2021-05-06 VITALS
SYSTOLIC BLOOD PRESSURE: 187 MMHG | WEIGHT: 140 LBS | RESPIRATION RATE: 15 BRPM | HEIGHT: 66 IN | HEART RATE: 67 BPM | TEMPERATURE: 98 F | OXYGEN SATURATION: 100 % | BODY MASS INDEX: 22.5 KG/M2 | DIASTOLIC BLOOD PRESSURE: 104 MMHG

## 2021-05-06 DIAGNOSIS — R03.0 ELEVATED BLOOD PRESSURE READING: Primary | ICD-10-CM

## 2021-05-06 DIAGNOSIS — R07.9 CHEST PAIN: ICD-10-CM

## 2021-05-06 LAB
ALBUMIN SERPL BCP-MCNC: 3.6 G/DL (ref 3.5–5.2)
ALP SERPL-CCNC: 92 U/L (ref 55–135)
ALT SERPL W/O P-5'-P-CCNC: 18 U/L (ref 10–44)
ANION GAP SERPL CALC-SCNC: 9 MMOL/L (ref 8–16)
AST SERPL-CCNC: 17 U/L (ref 10–40)
BASOPHILS # BLD AUTO: 0.03 K/UL (ref 0–0.2)
BASOPHILS NFR BLD: 0.5 % (ref 0–1.9)
BILIRUB SERPL-MCNC: 0.4 MG/DL (ref 0.1–1)
BNP SERPL-MCNC: 13 PG/ML (ref 0–99)
BUN SERPL-MCNC: 16 MG/DL (ref 6–20)
CALCIUM SERPL-MCNC: 9.3 MG/DL (ref 8.7–10.5)
CHLORIDE SERPL-SCNC: 107 MMOL/L (ref 95–110)
CO2 SERPL-SCNC: 26 MMOL/L (ref 23–29)
CREAT SERPL-MCNC: 1.4 MG/DL (ref 0.5–1.4)
DIFFERENTIAL METHOD: ABNORMAL
EOSINOPHIL # BLD AUTO: 0.1 K/UL (ref 0–0.5)
EOSINOPHIL NFR BLD: 1.1 % (ref 0–8)
ERYTHROCYTE [DISTWIDTH] IN BLOOD BY AUTOMATED COUNT: 14.4 % (ref 11.5–14.5)
EST. GFR  (AFRICAN AMERICAN): >60 ML/MIN/1.73 M^2
EST. GFR  (NON AFRICAN AMERICAN): >60 ML/MIN/1.73 M^2
GLUCOSE SERPL-MCNC: 100 MG/DL (ref 70–110)
HCT VFR BLD AUTO: 40.7 % (ref 40–54)
HGB BLD-MCNC: 13.4 G/DL (ref 14–18)
IMM GRANULOCYTES # BLD AUTO: 0.03 K/UL (ref 0–0.04)
IMM GRANULOCYTES NFR BLD AUTO: 0.5 % (ref 0–0.5)
LYMPHOCYTES # BLD AUTO: 1.9 K/UL (ref 1–4.8)
LYMPHOCYTES NFR BLD: 35.4 % (ref 18–48)
MCH RBC QN AUTO: 27 PG (ref 27–31)
MCHC RBC AUTO-ENTMCNC: 32.9 G/DL (ref 32–36)
MCV RBC AUTO: 82 FL (ref 82–98)
MONOCYTES # BLD AUTO: 0.4 K/UL (ref 0.3–1)
MONOCYTES NFR BLD: 6.6 % (ref 4–15)
NEUTROPHILS # BLD AUTO: 3.1 K/UL (ref 1.8–7.7)
NEUTROPHILS NFR BLD: 55.9 % (ref 38–73)
NRBC BLD-RTO: 0 /100 WBC
PLATELET # BLD AUTO: 303 K/UL (ref 150–450)
PMV BLD AUTO: 10 FL (ref 9.2–12.9)
POTASSIUM SERPL-SCNC: 3.9 MMOL/L (ref 3.5–5.1)
PROT SERPL-MCNC: 7.5 G/DL (ref 6–8.4)
RBC # BLD AUTO: 4.96 M/UL (ref 4.6–6.2)
SODIUM SERPL-SCNC: 142 MMOL/L (ref 136–145)
TROPONIN I SERPL DL<=0.01 NG/ML-MCNC: 0.01 NG/ML (ref 0–0.03)
TROPONIN I SERPL DL<=0.01 NG/ML-MCNC: 0.01 NG/ML (ref 0–0.03)
WBC # BLD AUTO: 5.48 K/UL (ref 3.9–12.7)

## 2021-05-06 PROCEDURE — 83880 ASSAY OF NATRIURETIC PEPTIDE: CPT | Performed by: STUDENT IN AN ORGANIZED HEALTH CARE EDUCATION/TRAINING PROGRAM

## 2021-05-06 PROCEDURE — 84484 ASSAY OF TROPONIN QUANT: CPT | Performed by: STUDENT IN AN ORGANIZED HEALTH CARE EDUCATION/TRAINING PROGRAM

## 2021-05-06 PROCEDURE — 80053 COMPREHEN METABOLIC PANEL: CPT | Performed by: STUDENT IN AN ORGANIZED HEALTH CARE EDUCATION/TRAINING PROGRAM

## 2021-05-06 PROCEDURE — 25000003 PHARM REV CODE 250: Performed by: STUDENT IN AN ORGANIZED HEALTH CARE EDUCATION/TRAINING PROGRAM

## 2021-05-06 PROCEDURE — 93010 ELECTROCARDIOGRAM REPORT: CPT | Mod: ,,, | Performed by: INTERNAL MEDICINE

## 2021-05-06 PROCEDURE — 93010 EKG 12-LEAD: ICD-10-PCS | Mod: ,,, | Performed by: INTERNAL MEDICINE

## 2021-05-06 PROCEDURE — 85025 COMPLETE CBC W/AUTO DIFF WBC: CPT | Performed by: STUDENT IN AN ORGANIZED HEALTH CARE EDUCATION/TRAINING PROGRAM

## 2021-05-06 PROCEDURE — 99285 EMERGENCY DEPT VISIT HI MDM: CPT | Mod: 25

## 2021-05-06 PROCEDURE — 93010 ELECTROCARDIOGRAM REPORT: CPT | Mod: 77,,, | Performed by: INTERNAL MEDICINE

## 2021-05-06 PROCEDURE — 93010 EKG 12-LEAD: ICD-10-PCS | Mod: 77,,, | Performed by: INTERNAL MEDICINE

## 2021-05-06 PROCEDURE — 93005 ELECTROCARDIOGRAM TRACING: CPT

## 2021-05-06 RX ORDER — AMLODIPINE BESYLATE 5 MG/1
5 TABLET ORAL
Status: COMPLETED | OUTPATIENT
Start: 2021-05-06 | End: 2021-05-06

## 2021-05-06 RX ORDER — HYDROCHLOROTHIAZIDE 25 MG/1
12.5 TABLET ORAL DAILY
Qty: 30 TABLET | Refills: 0 | Status: SHIPPED | OUTPATIENT
Start: 2021-05-06 | End: 2021-07-05

## 2021-05-06 RX ORDER — HYDROCHLOROTHIAZIDE 25 MG/1
25 TABLET ORAL
Status: COMPLETED | OUTPATIENT
Start: 2021-05-06 | End: 2021-05-06

## 2021-05-06 RX ORDER — AMLODIPINE BESYLATE 5 MG/1
10 TABLET ORAL DAILY
Qty: 60 TABLET | Refills: 1 | Status: SHIPPED | OUTPATIENT
Start: 2021-05-06 | End: 2021-07-05

## 2021-05-06 RX ORDER — ASPIRIN 325 MG
325 TABLET ORAL
Status: COMPLETED | OUTPATIENT
Start: 2021-05-06 | End: 2021-05-06

## 2021-05-06 RX ADMIN — HYDROCHLOROTHIAZIDE 25 MG: 25 TABLET ORAL at 11:05

## 2021-05-06 RX ADMIN — AMLODIPINE BESYLATE 5 MG: 5 TABLET ORAL at 01:05

## 2021-05-06 RX ADMIN — ASPIRIN 325 MG ORAL TABLET 325 MG: 325 PILL ORAL at 10:05

## 2022-06-30 ENCOUNTER — HOSPITAL ENCOUNTER (EMERGENCY)
Facility: HOSPITAL | Age: 42
Discharge: HOME | End: 2022-06-30
Attending: EMERGENCY MEDICINE
Payer: MEDICAID

## 2022-06-30 VITALS
BODY MASS INDEX: 32.62 KG/M2 | WEIGHT: 203 LBS | HEIGHT: 66 IN | RESPIRATION RATE: 20 BRPM | OXYGEN SATURATION: 96 % | TEMPERATURE: 98 F | DIASTOLIC BLOOD PRESSURE: 77 MMHG | SYSTOLIC BLOOD PRESSURE: 126 MMHG | HEART RATE: 92 BPM

## 2022-06-30 DIAGNOSIS — H66.91 ACUTE OTITIS MEDIA, RIGHT: Primary | ICD-10-CM

## 2022-06-30 PROCEDURE — 25000003 PHARM REV CODE 250: Performed by: NURSE PRACTITIONER

## 2022-06-30 PROCEDURE — 99283 EMERGENCY DEPT VISIT LOW MDM: CPT

## 2022-06-30 RX ORDER — AMOXICILLIN 250 MG/1
1000 CAPSULE ORAL
Status: COMPLETED | OUTPATIENT
Start: 2022-06-30 | End: 2022-06-30

## 2022-06-30 RX ORDER — HYDROCODONE BITARTRATE AND ACETAMINOPHEN 5; 325 MG/1; MG/1
1 TABLET ORAL
Status: COMPLETED | OUTPATIENT
Start: 2022-06-30 | End: 2022-06-30

## 2022-06-30 RX ORDER — AMOXICILLIN 875 MG/1
875 TABLET, FILM COATED ORAL 2 TIMES DAILY
Qty: 14 TABLET | Refills: 0 | Status: SHIPPED | OUTPATIENT
Start: 2022-06-30 | End: 2023-11-28

## 2022-06-30 RX ADMIN — AMOXICILLIN 1000 MG: 250 CAPSULE ORAL at 10:06

## 2022-06-30 RX ADMIN — HYDROCODONE BITARTRATE AND ACETAMINOPHEN 1 TABLET: 5; 325 TABLET ORAL at 10:06

## 2022-07-01 NOTE — DISCHARGE INSTRUCTIONS
§ Please return to the Emergency Department for any new or worsening symptoms including: fever, chest pain, shortness of breath, loss of consciousness, dizziness, weakness, or any other concerns.     § Schedule an appointment for follow up with Ear, Nose, and Throat as soon as possible for a recheck of your symptoms. If you do not have one, contact the one listed on your discharge paperwork or call the [facility] Appointment Desk at [phone] to schedule an appointment.     § If you require follow up care from a specialist and are unable to schedule an appointment with them directly, please contact your Primary Care Provider on the next business day to set up a referral.      § Please take all medication as prescribed. You have been prescribed Naproxen for pain. This is an Non-Steroidal Anti-Inflammatory (NSAID) Medication. Please do not take any additional NSAIDs while you are taking this medication including (Advil, Aleve, Motrin, Ibuprofen, Mobic\meloxicam, Naprosyn, Toradol, ketoralac, etc.). Please stop taking this medication if you experience: weakness, itching, yellow skin or eyes, joint pains, vomiting blood, blood or black stools, unusual weight gain, or swelling in your arms, legs, hands, or feet.

## 2022-07-01 NOTE — ED PROVIDER NOTES
Encounter Date: 6/30/2022    SCRIBE #1 NOTE: I, Aric Xiong, am scribing for, and in the presence of,  FOX Montemayor. I have scribed the following portions of the note - Other sections scribed: HPI & ROS.       History     Chief Complaint   Patient presents with    Otalgia     Complaining of right ear pain since yesterday, denies drainage     CC: Right Ear Pain    HPI: Rodolfo Rutledge, a 42 y.o. male presents to the ED with complaints of acute on chronic right ear pain that has worsened since yesterday. Patient endorses Hx of otalgia and reports past SHx to right ear. Patient denies recent follow up with ENT specialist. Denies any medication taken for symptoms since onset. Only reports compliancy with BP medicine. No other exacerbating or alleviating factors. Patient denies drainage of the ear, or any other associated symptoms.      Patient Active Problem List:     Acute ear pain      The history is provided by the patient. No  was used.     Review of patient's allergies indicates:  No Known Allergies  Past Medical History:   Diagnosis Date    Hypertension      Past Surgical History:   Procedure Laterality Date    ANKLE SURGERY      TYMPANOSTOMY TUBE PLACEMENT       Family History   Problem Relation Age of Onset    Asthma Mother     Diabetes Father      Social History     Tobacco Use    Smoking status: Current Some Day Smoker     Types: Cigarettes   Substance Use Topics    Alcohol use: Yes     Comment: sometimes    Drug use: No     Review of Systems   Constitutional: Negative for chills and fever.   HENT: Positive for ear pain (right). Negative for congestion, ear discharge, rhinorrhea, sore throat and trouble swallowing.    Eyes: Negative for visual disturbance.   Respiratory: Negative for cough and shortness of breath.    Cardiovascular: Negative for chest pain and leg swelling.   Gastrointestinal: Negative for abdominal pain, diarrhea, nausea and vomiting.   Genitourinary:  Negative for dysuria.   Musculoskeletal: Negative for back pain, neck pain and neck stiffness.   Skin: Negative for color change, rash and wound.   Neurological: Negative for seizures, syncope, speech difficulty, weakness and headaches.   Psychiatric/Behavioral: Negative for confusion.       Physical Exam     Initial Vitals [06/30/22 2032]   BP Pulse Resp Temp SpO2   126/77 92 16 98.3 °F (36.8 °C) 96 %      MAP       --         Physical Exam    Nursing note and vitals reviewed.  Constitutional: He appears well-developed and well-nourished. He is not diaphoretic. He is cooperative.  Non-toxic appearance. He does not have a sickly appearance. He does not appear ill. No distress.   HENT:   Head: Normocephalic and atraumatic.   Right Ear: External ear normal. Tympanic membrane is erythematous and bulging. Tympanic membrane is not perforated. No hemotympanum.   Left Ear: Tympanic membrane and external ear normal. Tympanic membrane is not perforated, not erythematous and not bulging. No hemotympanum.   Nose: Nose normal.   Mouth/Throat: Oropharynx is clear and moist and mucous membranes are normal. No trismus in the jaw.   Eyes: Conjunctivae and EOM are normal. No scleral icterus.   Neck: Phonation normal.   Normal range of motion.  Cardiovascular: Normal rate, regular rhythm and intact distal pulses.   Pulses:       Radial pulses are 2+ on the right side and 2+ on the left side.   Pulmonary/Chest: No tachypnea and no bradypnea. No respiratory distress. He has no wheezes. He has no rhonchi. He has no rales.   Abdominal: He exhibits no distension.   Musculoskeletal:         General: Normal range of motion.      Cervical back: Normal range of motion. No rigidity. Normal range of motion.     Lymphadenopathy:        Head (right side): Preauricular and posterior auricular adenopathy present.        Head (left side): No preauricular and no posterior auricular adenopathy present.   Neurological: He is alert and oriented to  person, place, and time. No sensory deficit. He exhibits normal muscle tone. Coordination and gait normal. GCS score is 15. GCS eye subscore is 4. GCS verbal subscore is 5. GCS motor subscore is 6.   Skin: Skin is warm and dry. Capillary refill takes less than 2 seconds. No bruising and no rash noted. No erythema.   Psychiatric: He has a normal mood and affect. His behavior is normal. Judgment and thought content normal.         ED Course   Procedures  Labs Reviewed - No data to display       Imaging Results    None          Medications   HYDROcodone-acetaminophen 5-325 mg per tablet 1 tablet (1 tablet Oral Given 6/30/22 2202)   amoxicillin capsule 1,000 mg (1,000 mg Oral Given 6/30/22 2202)     Medical Decision Making:   History:   Old Medical Records: I decided to obtain old medical records.       APC / Resident Notes:   This is an evaluation of a 42 y.o. male that presents to the Emergency Department for Right Ear Pain. The patient is a non-toxic, afebrile, and well appearing male. Right TM and Canal:  With a very erythematous and bulging TM with signs of infection.  No perforation no canal erythema or discharge. Left TM and Canal:  Normal. No mastoid tenderness, erythema, or edema present bilaterally.  Right-sided Pre and postauricular lymphadenopathy.      Given the above findings, my overall impression is acute right otitis media. Given the above findings, I do not think the patient has meningitis, OE, mastoiditis, perforated TM, foreign body, or systemic bacterial infection.    DC instructions: Naproxen, Amoxil. The diagnosis, treatment plan, instructions for follow-up and reevaluation with ENT as well as ED return precautions have been discussed and patient/family have verbalized an understanding of the information. All questions or concerns have been addressed. LAURA Elliott, FNP-C       Scribe Attestation:   Scribe #1: I performed the above scribed service and the documentation accurately describes the  services I performed. I attest to the accuracy of the note.                 Clinical Impression:   Final diagnoses:  [H66.91] Acute otitis media, right (Primary)         I, LAURA Elliott, FOX-C, personally performed the services described in this documentation. All medical record entries made by the scribe were at my direction and in my presence. I have reviewed the chart and agree that the record reflects my personal performance and is accurate and complete.     ED Disposition Condition    Discharge Stable        ED Prescriptions     Medication Sig Dispense Start Date End Date Auth. Provider    amoxicillin (AMOXIL) 875 MG tablet Take 1 tablet (875 mg total) by mouth 2 (two) times daily. 14 tablet 6/30/2022  FOX Leigh        Follow-up Information     Follow up With Specialties Details Why Contact Info    Marlin Mejía MD Otolaryngology Call in 1 day To discuss your ED visit & schedule follow-up [address] [place] [address]  [phone]      [facility] - Emergency Dept Emergency Medicine Go to  If symptoms worsen [address] [facility] [id number] [phone]           FOX Leigh  06/30/22 [id number]

## 2022-07-01 NOTE — ED TRIAGE NOTES
Pts presents to the ED with complaints of  of right ear pain since yesterday  Pt denies any other symptoms  Pt [id number]

## 2023-05-10 ENCOUNTER — HOSPITAL ENCOUNTER (EMERGENCY)
Facility: HOSPITAL | Age: 43
Discharge: HOME | End: 2023-05-10
Attending: EMERGENCY MEDICINE
Payer: MEDICAID

## 2023-05-10 VITALS
RESPIRATION RATE: 18 BRPM | DIASTOLIC BLOOD PRESSURE: 118 MMHG | HEART RATE: 69 BPM | HEIGHT: 66 IN | OXYGEN SATURATION: 99 % | WEIGHT: 203 LBS | SYSTOLIC BLOOD PRESSURE: 182 MMHG | TEMPERATURE: 98 F | BODY MASS INDEX: 32.62 KG/M2

## 2023-05-10 DIAGNOSIS — M79.605 LEFT LEG PAIN: ICD-10-CM

## 2023-05-10 DIAGNOSIS — M54.6 THORACIC BACK PAIN: ICD-10-CM

## 2023-05-10 DIAGNOSIS — G44.319 ACUTE POST-TRAUMATIC HEADACHE, NOT INTRACTABLE: ICD-10-CM

## 2023-05-10 DIAGNOSIS — S13.4XXA WHIPLASH INJURIES, INITIAL ENCOUNTER: ICD-10-CM
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
## 2023-05-10 DIAGNOSIS — R07.89 LEFT-SIDED CHEST WALL PAIN: ICD-10-CM

## 2023-05-10 PROCEDURE — 99285 EMERGENCY DEPT VISIT HI MDM: CPT | Mod: 25

## 2023-05-10 PROCEDURE — 63600175 PHARM REV CODE 636 W HCPCS

## 2023-05-10 PROCEDURE — 25000003 PHARM REV CODE 250

## 2023-05-10 PROCEDURE — 96372 THER/PROPH/DIAG INJ SC/IM: CPT

## 2023-05-10 RX ORDER — METHOCARBAMOL 500 MG/1
1000 TABLET, FILM COATED ORAL 3 TIMES DAILY PRN
Qty: 30 TABLET | Refills: 0 | Status: SHIPPED | OUTPATIENT
Start: 2023-05-10

## 2023-05-10 RX ORDER — KETOROLAC TROMETHAMINE 30 MG/ML
15 INJECTION, SOLUTION INTRAMUSCULAR; INTRAVENOUS
Status: COMPLETED | OUTPATIENT
Start: 2023-05-10 | End: 2023-05-10

## 2023-05-10 RX ORDER — LIDOCAINE 50 MG/G
2 PATCH TOPICAL
Status: DISCONTINUED | OUTPATIENT
Start: 2023-05-10 | End: 2023-05-10 | Stop reason: HOSPADM

## 2023-05-10 RX ORDER — LIDOCAINE 50 MG/G
1 PATCH TOPICAL DAILY
Qty: 15 PATCH | Refills: 0 | Status: SHIPPED | OUTPATIENT
Start: 2023-05-10

## 2023-05-10 RX ORDER — ACETAMINOPHEN 500 MG
500 TABLET ORAL EVERY 6 HOURS PRN
Qty: 20 TABLET | Refills: 0 | Status: SHIPPED | OUTPATIENT
Start: 2023-05-10

## 2023-05-10 RX ORDER — IBUPROFEN 600 MG/1
600 TABLET ORAL EVERY 6 HOURS PRN
Qty: 20 TABLET | Refills: 0 | Status: SHIPPED | OUTPATIENT
Start: 2023-05-10

## 2023-05-10 RX ADMIN — KETOROLAC TROMETHAMINE 15 MG: 30 INJECTION, SOLUTION INTRAMUSCULAR; INTRAVENOUS at 01:05

## 2023-05-10 RX ADMIN — LIDOCAINE 2 PATCH: 50 PATCH TOPICAL at 01:05

## 2023-05-10 NOTE — DISCHARGE INSTRUCTIONS
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
## 2023-06-07 ENCOUNTER — OFFICE VISIT (OUTPATIENT)
Dept: URGENT CARE | Facility: CLINIC | Age: 43
End: 2023-06-07
Payer: MEDICAID

## 2023-06-07 VITALS
WEIGHT: 195 LBS | OXYGEN SATURATION: 99 % | HEIGHT: 69 IN | BODY MASS INDEX: 28.88 KG/M2 | SYSTOLIC BLOOD PRESSURE: 168 MMHG | DIASTOLIC BLOOD PRESSURE: 125 MMHG | RESPIRATION RATE: 16 BRPM | HEART RATE: 81 BPM | TEMPERATURE: 99 F

## 2023-06-07 DIAGNOSIS — I10 ELEVATED BLOOD PRESSURE READING IN OFFICE WITH DIAGNOSIS OF HYPERTENSION: ICD-10-CM

## 2023-06-07 DIAGNOSIS — H66.91 RIGHT OTITIS MEDIA, UNSPECIFIED OTITIS MEDIA TYPE: Primary | ICD-10-CM

## 2023-06-07 PROCEDURE — 99203 OFFICE O/P NEW LOW 30 MIN: CPT | Mod: S$GLB,,, | Performed by: NURSE PRACTITIONER

## 2023-06-07 PROCEDURE — 99203 PR OFFICE/OUTPT VISIT, NEW, LEVL III, 30-44 MIN: ICD-10-PCS | Mod: S$GLB,,, | Performed by: NURSE PRACTITIONER

## 2023-06-07 RX ORDER — IBUPROFEN 800 MG/1
800 TABLET ORAL EVERY 8 HOURS PRN
Qty: 30 TABLET | Refills: 0 | Status: SHIPPED | OUTPATIENT
Start: 2023-06-07

## 2023-06-07 RX ORDER — AMOXICILLIN AND CLAVULANATE POTASSIUM 875; 125 MG/1; MG/1
1 TABLET, FILM COATED ORAL EVERY 12 HOURS
Qty: 14 TABLET | Refills: 0 | Status: SHIPPED | OUTPATIENT
Start: 2023-06-07 | End: 2023-06-14

## 2023-06-07 NOTE — PATIENT INSTRUCTIONS
EAR INFECTION  Take full course of antibiotics as prescribed.  Warm compresses to affected ear  Elevate head on a pillow at night   Use nasal spray directed for nasal congestion  Tylenol or Motrin every 4 - 6 hours as needed for fever or ear pain.  Follow up with your PCP in 1 week of initiating antibiotics or sooner for no improvement in symptoms  Follow up in the ER for any worsening of symptoms such as new fever, increasing ear pain, neck stiffness, shortness of breath, etc.  If you smoke, stop smoking.     TAKE YOU BLOOD PRESSURE MEDICINE DAILY    General Discharge Instructions   If you were prescribed a narcotic or controlled medication, do not drive or operate heavy equipment or machinery while taking these medications.  If you were prescribed antibiotics, please take them to completion.  You must understand that you've received an Urgent Care treatment only and that you may be released before all your medical problems are known or treated. You, the patient, will arrange for follow up care as instructed.  Follow up with your PCP or specialty clinic as directed in the next 1-2 weeks if not improved or as needed.  You can call [phone] to schedule an appointment with the appropriate provider.  If your condition worsens we recommend that you receive another evaluation at the emergency room immediately or contact your primary medical clinics after hours call service to discuss your concerns.  Please return here or go to the Emergency Department for any concerns or worsening of condition.      WE CANNOT RULE OUT ALL POSSIBLE CAUSES OF YOUR SYMPTOMS IN THE URGENT CARE SETTING PLEASE GO TO THE ER IF YOU FEELS YOUR CONDITION IS WORSENING OR YOU WOULD LIKE EMERGENT EVALUATION.
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
## 2023-06-13 ENCOUNTER — HOSPITAL ENCOUNTER (EMERGENCY)
Facility: HOSPITAL | Age: 43
Discharge: HOME | End: 2023-06-13
Attending: EMERGENCY MEDICINE
Payer: MEDICAID

## 2023-06-13 VITALS
OXYGEN SATURATION: 98 % | RESPIRATION RATE: 16 BRPM | SYSTOLIC BLOOD PRESSURE: 138 MMHG | WEIGHT: 170 LBS | TEMPERATURE: 98 F | HEART RATE: 74 BPM | BODY MASS INDEX: 25.1 KG/M2 | DIASTOLIC BLOOD PRESSURE: 74 MMHG

## 2023-06-13 DIAGNOSIS — K21.9 GASTROESOPHAGEAL REFLUX DISEASE, UNSPECIFIED WHETHER ESOPHAGITIS PRESENT: ICD-10-CM

## 2023-06-13 DIAGNOSIS — R07.9 CHEST PAIN: Primary | ICD-10-CM

## 2023-06-13 LAB
ALBUMIN SERPL BCP-MCNC: 4.2 G/DL (ref 3.5–5.2)
ALP SERPL-CCNC: 69 U/L (ref 55–135)
ALT SERPL W/O P-5'-P-CCNC: 45 U/L (ref 10–44)
ANION GAP SERPL CALC-SCNC: 8 MMOL/L (ref 8–16)
AST SERPL-CCNC: 39 U/L (ref 10–40)
BASOPHILS # BLD AUTO: 0.02 K/UL (ref 0–0.2)
BASOPHILS NFR BLD: 0.4 % (ref 0–1.9)
BILIRUB SERPL-MCNC: 0.5 MG/DL (ref 0.1–1)
BNP SERPL-MCNC: 10 PG/ML (ref 0–99)
BUN SERPL-MCNC: 18 MG/DL (ref 6–20)
CALCIUM SERPL-MCNC: 9.4 MG/DL (ref 8.7–10.5)
CHLORIDE SERPL-SCNC: 103 MMOL/L (ref 95–110)
CO2 SERPL-SCNC: 29 MMOL/L (ref 23–29)
CREAT SERPL-MCNC: 1.1 MG/DL (ref 0.5–1.4)
DIFFERENTIAL METHOD: ABNORMAL
EOSINOPHIL # BLD AUTO: 0 K/UL (ref 0–0.5)
EOSINOPHIL NFR BLD: 0.4 % (ref 0–8)
ERYTHROCYTE [DISTWIDTH] IN BLOOD BY AUTOMATED COUNT: 14.3 % (ref 11.5–14.5)
EST. GFR  (NO RACE VARIABLE): >60 ML/MIN/1.73 M^2
GLUCOSE SERPL-MCNC: 104 MG/DL (ref 70–110)
HCT VFR BLD AUTO: 41.2 % (ref 40–54)
HGB BLD-MCNC: 13.6 G/DL (ref 14–18)
IMM GRANULOCYTES # BLD AUTO: 0.02 K/UL (ref 0–0.04)
IMM GRANULOCYTES NFR BLD AUTO: 0.4 % (ref 0–0.5)
INR PPP: 0.9 (ref 0.8–1.2)
LYMPHOCYTES # BLD AUTO: 1.6 K/UL (ref 1–4.8)
LYMPHOCYTES NFR BLD: 30.6 % (ref 18–48)
MAGNESIUM SERPL-MCNC: 2.1 MG/DL (ref 1.6–2.6)
MCH RBC QN AUTO: 28.3 PG (ref 27–31)
MCHC RBC AUTO-ENTMCNC: 33 G/DL (ref 32–36)
MCV RBC AUTO: 86 FL (ref 82–98)
MONOCYTES # BLD AUTO: 0.4 K/UL (ref 0.3–1)
MONOCYTES NFR BLD: 6.6 % (ref 4–15)
NEUTROPHILS # BLD AUTO: 3.3 K/UL (ref 1.8–7.7)
NEUTROPHILS NFR BLD: 61.6 % (ref 38–73)
NRBC BLD-RTO: 0 /100 WBC
PLATELET # BLD AUTO: 267 K/UL (ref 150–450)
PMV BLD AUTO: 10.5 FL (ref 9.2–12.9)
POTASSIUM SERPL-SCNC: 3 MMOL/L (ref 3.5–5.1)
PROT SERPL-MCNC: 8.1 G/DL (ref 6–8.4)
PROTHROMBIN TIME: 10.3 SEC (ref 9–12.5)
RBC # BLD AUTO: 4.8 M/UL (ref 4.6–6.2)
SODIUM SERPL-SCNC: 140 MMOL/L (ref 136–145)
TROPONIN I SERPL HS-MCNC: 18.7 PG/ML (ref 0–14.9)
TROPONIN I SERPL HS-MCNC: 19 PG/ML (ref 0–14.9)
WBC # BLD AUTO: 5.29 K/UL (ref 3.9–12.7)

## 2023-06-13 PROCEDURE — 93005 ELECTROCARDIOGRAM TRACING: CPT | Performed by: GENERAL PRACTICE

## 2023-06-13 PROCEDURE — 85025 COMPLETE CBC W/AUTO DIFF WBC: CPT | Performed by: EMERGENCY MEDICINE

## 2023-06-13 PROCEDURE — 83735 ASSAY OF MAGNESIUM: CPT | Performed by: EMERGENCY MEDICINE

## 2023-06-13 PROCEDURE — 84484 ASSAY OF TROPONIN QUANT: CPT | Mod: 91 | Performed by: EMERGENCY MEDICINE

## 2023-06-13 PROCEDURE — 80053 COMPREHEN METABOLIC PANEL: CPT | Performed by: EMERGENCY MEDICINE

## 2023-06-13 PROCEDURE — 85610 PROTHROMBIN TIME: CPT | Performed by: EMERGENCY MEDICINE

## 2023-06-13 PROCEDURE — 83880 ASSAY OF NATRIURETIC PEPTIDE: CPT | Performed by: EMERGENCY MEDICINE

## 2023-06-13 PROCEDURE — 93010 EKG 12-LEAD: ICD-10-PCS | Mod: ,,, | Performed by: GENERAL PRACTICE

## 2023-06-13 PROCEDURE — 25000003 PHARM REV CODE 250: Performed by: EMERGENCY MEDICINE

## 2023-06-13 PROCEDURE — 93010 ELECTROCARDIOGRAM REPORT: CPT | Mod: ,,, | Performed by: GENERAL PRACTICE

## 2023-06-13 PROCEDURE — 93010 EKG 12-LEAD: ICD-10-PCS | Mod: 77,,, | Performed by: SPECIALIST

## 2023-06-13 PROCEDURE — 93005 ELECTROCARDIOGRAM TRACING: CPT | Performed by: SPECIALIST

## 2023-06-13 PROCEDURE — 36415 COLL VENOUS BLD VENIPUNCTURE: CPT | Performed by: EMERGENCY MEDICINE

## 2023-06-13 PROCEDURE — 96374 THER/PROPH/DIAG INJ IV PUSH: CPT

## 2023-06-13 PROCEDURE — 93010 ELECTROCARDIOGRAM REPORT: CPT | Mod: 77,,, | Performed by: SPECIALIST

## 2023-06-13 PROCEDURE — 99285 EMERGENCY DEPT VISIT HI MDM: CPT | Mod: 25

## 2023-06-13 RX ORDER — FAMOTIDINE 10 MG/ML
20 INJECTION INTRAVENOUS
Status: COMPLETED | OUTPATIENT
Start: 2023-06-13 | End: 2023-06-13

## 2023-06-13 RX ORDER — PANTOPRAZOLE SODIUM 20 MG/1
20 TABLET, DELAYED RELEASE ORAL DAILY
Qty: 14 TABLET | Refills: 0 | Status: SHIPPED | OUTPATIENT
Start: 2023-06-13 | End: 2023-06-27

## 2023-06-13 RX ORDER — LIDOCAINE HYDROCHLORIDE 20 MG/ML
15 SOLUTION OROPHARYNGEAL ONCE
Status: COMPLETED | OUTPATIENT
Start: 2023-06-13 | End: 2023-06-13

## 2023-06-13 RX ORDER — MAG HYDROX/ALUMINUM HYD/SIMETH 200-200-20
30 SUSPENSION, ORAL (FINAL DOSE FORM) ORAL ONCE
Status: COMPLETED | OUTPATIENT
Start: 2023-06-13 | End: 2023-06-13

## 2023-06-13 RX ORDER — ASPIRIN 325 MG
325 TABLET ORAL
Status: COMPLETED | OUTPATIENT
Start: 2023-06-13 | End: 2023-06-13

## 2023-06-13 RX ORDER — NITROGLYCERIN 0.4 MG/1
0.4 TABLET SUBLINGUAL EVERY 5 MIN PRN
Status: DISCONTINUED | OUTPATIENT
Start: 2023-06-13 | End: 2023-06-13 | Stop reason: HOSPADM

## 2023-06-13 RX ADMIN — LIDOCAINE HYDROCHLORIDE 15 ML: 20 SOLUTION ORAL; TOPICAL at 10:06

## 2023-06-13 RX ADMIN — POTASSIUM BICARBONATE 50 MEQ: 977.5 TABLET, EFFERVESCENT ORAL at 12:06

## 2023-06-13 RX ADMIN — ASPIRIN 325 MG: 325 TABLET ORAL at 12:06

## 2023-06-13 RX ADMIN — FAMOTIDINE 20 MG: 10 INJECTION INTRAVENOUS at 10:06

## 2023-06-13 RX ADMIN — ALUMINUM HYDROXIDE, MAGNESIUM HYDROXIDE, AND SIMETHICONE 30 ML: 200; 200; 20 SUSPENSION ORAL at 10:06

## 2023-06-13 NOTE — ED PROVIDER NOTES
Encounter Date: 6/13/2023       History     Chief Complaint   Patient presents with    Chest Pain     Intermittent substernal/epigastric     Emergent evaluation of a 43-year-old male with history of hypertension who takes hydrochlorothiazide and Norvasc, 2 cigarettes per day tobacco use, occasional alcohol use and no significant cardiac family history presents to the ER due to chest pain that began on Sunday night occurred on Monday night and was present this morning he reports pain was 8/10 this morning which prompted him to come to the ER currently 6/10.  He reports pain is just to the left of the lower sternum.  Pressure-like but also sharp and lasted 1 hour each night resolving on its own with no associated symptoms of shortness of breath weakness dizziness lightheadedness diaphoresis nausea or vomiting.  Patient reports that he did feel that it was worse after eating fatty heavy foods on Sunday and Monday.  He does report history of acid reflux but does not feel that this is his acid reflux.  He reports that he occasionally drinks alcohol but did not drink Sunday or Monday.    Review of patient's allergies indicates:  No Known Allergies  Past Medical History:   Diagnosis Date    Hypertension      Past Surgical History:   Procedure Laterality Date    ANKLE SURGERY      TYMPANOSTOMY TUBE PLACEMENT       Family History   Problem Relation Age of Onset    Asthma Mother     Diabetes Father      Social History     Tobacco Use    Smoking status: Some Days     Types: Cigarettes   Substance Use Topics    Alcohol use: Yes     Comment: sometimes    Drug use: No     Review of Systems   Constitutional:  Negative for activity change, appetite change, chills, diaphoresis, fatigue and fever.   HENT:  Negative for congestion, postnasal drip and rhinorrhea.    Respiratory:  Negative for cough, chest tightness, shortness of breath and wheezing.    Cardiovascular:  Positive for chest pain. Negative for palpitations.    Gastrointestinal:  Positive for abdominal pain. Negative for constipation, diarrhea, nausea and vomiting.   Musculoskeletal:  Negative for neck pain and neck stiffness.   Neurological:  Negative for dizziness, weakness, light-headedness, numbness and headaches.   All other systems reviewed and are negative.    Physical Exam     Initial Vitals   BP Pulse Resp Temp SpO2   06/13/23 0902 06/13/23 0902 06/13/23 0902 06/13/23 0904 06/13/23 0902   (!) 145/88 80 18 98.2 °F (36.8 °C) 100 %      MAP       --                Physical Exam    Nursing note and vitals reviewed.  Constitutional: He appears well-developed and well-nourished. He is not diaphoretic. No distress.   HENT:   Head: Normocephalic and atraumatic.   Right Ear: External ear normal.   Left Ear: External ear normal.   Nose: Nose normal.   Mouth/Throat: Oropharynx is clear and moist.   Eyes: Conjunctivae and EOM are normal. Pupils are equal, round, and reactive to light.   Neck: Neck supple. No tracheal deviation present.   Normal range of motion.  Cardiovascular:  Normal rate, regular rhythm, normal heart sounds and intact distal pulses.     Exam reveals no gallop and no friction rub.       No murmur heard.  Blood pressure 145/88 pulse 80   Pulmonary/Chest: Breath sounds normal. No stridor. No respiratory distress. He has no wheezes. He has no rhonchi. He has no rales. He exhibits no tenderness.   Abdominal: Abdomen is soft. Bowel sounds are normal. He exhibits no distension and no mass. There is abdominal tenderness.   Tenderness in right upper quadrant no Rosado sign mild tenderness to epigastrium and over xiphoid process There is no rebound and no guarding.   Musculoskeletal:         General: No edema. Normal range of motion.      Cervical back: Normal range of motion and neck supple.     Neurological: He is alert and oriented to person, place, and time. He has normal strength. No cranial nerve deficit or sensory deficit.   Skin: Skin is warm and dry. No  rash noted. No erythema. No pallor.   Psychiatric: He has a normal mood and affect. His behavior is normal. Judgment and thought content normal.       ED Course   Procedures  Labs Reviewed   CBC W/ AUTO DIFFERENTIAL   COMPREHENSIVE METABOLIC PANEL   B-TYPE NATRIURETIC PEPTIDE   TROPONIN I HIGH SENSITIVITY   PROTIME-INR     EKG Readings: (Independently Interpreted)   Initial Reading: No STEMI. Previous EKG: Compared with most recent EKG Rhythm: Normal Sinus Rhythm. Heart Rate: 79.   LVH  Patient has inverted T-waves in leads 2 3 AVF and V6 no ST depression    Compared to May 2021 similar in appearance when patient also had inverted T-waves in inferior lateral leads but at that time also had inverted T-wave in V5   ECG Results              EKG 12-lead (In process)  Result time 06/13/23 09:08:37      In process by Interface, Lab In [facility] (06/13/23 09:08:37)                   Narrative:    Test Reason : R07.9,    Vent. Rate : 079 BPM     Atrial Rate : 079 BPM     P-R Int : 136 ms          QRS Dur : 076 ms      QT Int : 390 ms       P-R-T Axes : 008 -21 -19 degrees     QTc Int : 447 ms    Normal sinus rhythm  Moderate voltage criteria for LVH, may be normal variant  Cannot rule out Septal infarct (cited on or before 06-MAY-2021)  Abnormal ECG  When compared with ECG of 06-MAY-2021 09:58,  Serial changes of Septal infarct Present    Referred By: [email or web address]   SELF           Confirmed By:                                   Imaging Results              X-Ray Chest AP Portable (In process)                   X-Rays:   Independently Interpreted Readings:   Chest X-Ray: Normal heart size.  No infiltrates.  No acute abnormalities.   Medications - No data to display  Medical Decision Making:   Independently Interpreted Test(s):   I have ordered and independently interpreted X-rays - see prior notes.  I have ordered and independently interpreted EKG Reading(s) - see prior notes  Clinical Tests:   Lab Tests: Ordered and  Reviewed  Radiological Study: Ordered and Reviewed  Medical Tests: Ordered and Reviewed  ED Management:  Emergent evaluation of a 43-year-old male with history of hypertension who takes hydrochlorothiazide and Norvasc, 2 cigarettes per day tobacco use, occasional alcohol use and no significant cardiac family history presents to the ER due to chest pain that began on Sunday night occurred on Monday night and was present this morning he reports pain was 8/10 this morning which prompted him to come to the ER currently 6/10.  He reports pain is just to the left of the lower sternum.  Pressure-like but also sharp and lasted 1 hour each night resolving on its own with no associated symptoms of shortness of breath weakness dizziness lightheadedness diaphoresis nausea or vomiting.  Patient reports that he did feel that it was worse after eating fatty heavy foods on Sunday and Monday.  He does report history of acid reflux but does not feel that this is his acid reflux.  He reports that he occasionally drinks alcohol but did not drink Sunday or Monday.  On physical exam patient is in no distress blood pressure 145/88 other vitals are normal.  Mild tenderness to right upper quadrant without Rosado sign rebound or guarding mild epigastric tenderness and tenderness over xiphoid process.  No tenderness throughout the rest of the abdomen.  Normal cardiac and lung exam.  MDM    Patient presents for emergent evaluation of acute chest pain intermittent for the past 3 days that poses a threat to life and/or bodily function.   Differential diagnosis includes but was not limited to ACS including unstable angina, MI, GERD, peptic ulcer disease, duodenitis, pancreatitis, esophagitis, biliary colic, cholelithiasis, choledocholithiasis, cholecystitis, gastroenteritis or gastritis, abdominal aortic aneurysm, aortic dissection, PE.   In the ED patient found to have acute epigastric abdominal pain right upper quadrant abdominal pain atypical  chest pain.    I ordered labs and personally reviewed them.  Labs significant for see above.  I ordered X-rays and personally reviewed them and reviewed the radiologist interpretation.  Xray significant for see above.    I ordered EKG and personally reviewed it.  EKG significant for see above.        Discharge MDM     Patient was managed in the ED with IV Pepcid 20 mg, GI cocktail.  Patient is awaiting ultrasound of the right upper quadrant all lab work at this time including 2 sets of troponins  Imani Bland M.D.  10:12 AM 6/13/2023   First troponin 12nd troponin 18.7 no significant change in 2 troponins patient will be discharged home to follow up with primary care physician and Cardiology for outpatient stress testing will write a prescription for Protonix.  Patient had a heart score of 2  The response to treatment was good.    Patient was discharged in stable condition.  Detailed return precautions discussed.  Patient was told to follow up with primary care physician or specialist based on their diagnosis  Imani Bland MD    Additional MDM:   Heart Score:    History:          Slightly suspicious.  ECG:             Nonspecific repolarisation disturbance  Age:               Less than 45 years  Risk factors: 1-2 risk factors  Troponin:       Less than or equal to normal limit  Final Score: 2          ED Course as of 06/13/23 1424   Tue Jun 13, 2023   1144 First troponin was elevated 19 patient understands he is awaiting a 2nd troponin and that he is NPO at this time when he was checked on he reported his pain was now 0/10 after the Pepcid 20 mg IV and GI cocktail.  He will be given aspirin.  Nitro has been ordered if he has recurrent chest pain.  CMP showed a potassium of 3 which will be replaced orally with 50 mEq.  Magnesium will be added on normal coags BNP and CBC  Patient has been updated on plan of care including repeat troponin and remaining NPO until I have further results  Will also repeat  EKG  Imani Bland M.D.  11:45 AM 6/13/2023   [RM]      ED Course User Index  [RM] Imani Bland MD                 Clinical Impression:   Final diagnoses:  [R07.9] Chest pain               Imani Bland MD  06/13/23 1426

## 2023-08-14 ENCOUNTER — HOSPITAL ENCOUNTER (EMERGENCY)
Facility: HOSPITAL | Age: 43
Discharge: HOME | End: 2023-08-14
Attending: EMERGENCY MEDICINE
Payer: MEDICAID

## 2023-08-14 VITALS
HEIGHT: 69 IN | DIASTOLIC BLOOD PRESSURE: 96 MMHG | RESPIRATION RATE: 30 BRPM | OXYGEN SATURATION: 100 % | BODY MASS INDEX: 23.7 KG/M2 | HEART RATE: 87 BPM | WEIGHT: 160 LBS | SYSTOLIC BLOOD PRESSURE: 156 MMHG | TEMPERATURE: 98 F

## 2023-08-14 DIAGNOSIS — I10 HYPERTENSION, UNSPECIFIED TYPE: Primary | ICD-10-CM

## 2023-08-14 LAB
ALBUMIN SERPL BCP-MCNC: 4.6 G/DL (ref 3.5–5.2)
ALP SERPL-CCNC: 78 U/L (ref 55–135)
ALT SERPL W/O P-5'-P-CCNC: 29 U/L (ref 10–44)
AMPHET+METHAMPHET UR QL: NEGATIVE
ANION GAP SERPL CALC-SCNC: 10 MMOL/L (ref 8–16)
AST SERPL-CCNC: 33 U/L (ref 10–40)
BARBITURATES UR QL SCN>200 NG/ML: NEGATIVE
BASOPHILS # BLD AUTO: 0.02 K/UL (ref 0–0.2)
BASOPHILS NFR BLD: 0.3 % (ref 0–1.9)
BENZODIAZ UR QL SCN>200 NG/ML: NEGATIVE
BILIRUB SERPL-MCNC: 0.8 MG/DL (ref 0.1–1)
BILIRUB UR QL STRIP: NEGATIVE
BNP SERPL-MCNC: 34 PG/ML (ref 0–99)
BUN SERPL-MCNC: 16 MG/DL (ref 6–20)
BZE UR QL SCN: NEGATIVE
CALCIUM SERPL-MCNC: 9.7 MG/DL (ref 8.7–10.5)
CANNABINOIDS UR QL SCN: NEGATIVE
CHLORIDE SERPL-SCNC: 98 MMOL/L (ref 95–110)
CLARITY UR: CLEAR
CO2 SERPL-SCNC: 28 MMOL/L (ref 23–29)
COLOR UR: COLORLESS
CREAT SERPL-MCNC: 1.1 MG/DL (ref 0.5–1.4)
CREAT UR-MCNC: 66 MG/DL (ref 23–375)
DIFFERENTIAL METHOD: NORMAL
EOSINOPHIL # BLD AUTO: 0 K/UL (ref 0–0.5)
EOSINOPHIL NFR BLD: 0.7 % (ref 0–8)
ERYTHROCYTE [DISTWIDTH] IN BLOOD BY AUTOMATED COUNT: 13.9 % (ref 11.5–14.5)
EST. GFR  (NO RACE VARIABLE): >60 ML/MIN/1.73 M^2
GLUCOSE SERPL-MCNC: 110 MG/DL (ref 70–110)
GLUCOSE UR QL STRIP: NEGATIVE
HCT VFR BLD AUTO: 45 % (ref 40–54)
HGB BLD-MCNC: 15 G/DL (ref 14–18)
HGB UR QL STRIP: NEGATIVE
IMM GRANULOCYTES # BLD AUTO: 0.02 K/UL (ref 0–0.04)
IMM GRANULOCYTES NFR BLD AUTO: 0.3 % (ref 0–0.5)
KETONES UR QL STRIP: NEGATIVE
LEUKOCYTE ESTERASE UR QL STRIP: NEGATIVE
LYMPHOCYTES # BLD AUTO: 1.7 K/UL (ref 1–4.8)
LYMPHOCYTES NFR BLD: 28 % (ref 18–48)
MAGNESIUM SERPL-MCNC: 2.1 MG/DL (ref 1.6–2.6)
MCH RBC QN AUTO: 27.4 PG (ref 27–31)
MCHC RBC AUTO-ENTMCNC: 33.3 G/DL (ref 32–36)
MCV RBC AUTO: 82 FL (ref 82–98)
MONOCYTES # BLD AUTO: 0.4 K/UL (ref 0.3–1)
MONOCYTES NFR BLD: 7 % (ref 4–15)
NEUTROPHILS # BLD AUTO: 3.9 K/UL (ref 1.8–7.7)
NEUTROPHILS NFR BLD: 63.7 % (ref 38–73)
NITRITE UR QL STRIP: NEGATIVE
NRBC BLD-RTO: 0 /100 WBC
OPIATES UR QL SCN: NEGATIVE
PCP UR QL SCN>25 NG/ML: NEGATIVE
PH UR STRIP: 7 [PH] (ref 5–8)
PLATELET # BLD AUTO: 302 K/UL (ref 150–450)
PMV BLD AUTO: 10.9 FL (ref 9.2–12.9)
POTASSIUM SERPL-SCNC: 2.8 MMOL/L (ref 3.5–5.1)
PROT SERPL-MCNC: 8.8 G/DL (ref 6–8.4)
PROT UR QL STRIP: NEGATIVE
RBC # BLD AUTO: 5.47 M/UL (ref 4.6–6.2)
SODIUM SERPL-SCNC: 136 MMOL/L (ref 136–145)
SP GR UR STRIP: 1.01 (ref 1–1.03)
TOXICOLOGY INFORMATION: NORMAL
TROPONIN I SERPL HS-MCNC: 21.3 PG/ML (ref 0–14.9)
TROPONIN I SERPL HS-MCNC: 22.9 PG/ML (ref 0–14.9)
URN SPEC COLLECT METH UR: ABNORMAL
UROBILINOGEN UR STRIP-ACNC: NEGATIVE EU/DL
WBC # BLD AUTO: 6.15 K/UL (ref 3.9–12.7)

## 2023-08-14 PROCEDURE — 96374 THER/PROPH/DIAG INJ IV PUSH: CPT

## 2023-08-14 PROCEDURE — 84484 ASSAY OF TROPONIN QUANT: CPT | Mod: 91 | Performed by: EMERGENCY MEDICINE

## 2023-08-14 PROCEDURE — 84484 ASSAY OF TROPONIN QUANT: CPT | Performed by: EMERGENCY MEDICINE

## 2023-08-14 PROCEDURE — 85025 COMPLETE CBC W/AUTO DIFF WBC: CPT | Performed by: EMERGENCY MEDICINE

## 2023-08-14 PROCEDURE — 93010 EKG 12-LEAD: ICD-10-PCS | Mod: ,,, | Performed by: INTERNAL MEDICINE

## 2023-08-14 PROCEDURE — 25000003 PHARM REV CODE 250: Performed by: EMERGENCY MEDICINE

## 2023-08-14 PROCEDURE — 83880 ASSAY OF NATRIURETIC PEPTIDE: CPT | Performed by: EMERGENCY MEDICINE

## 2023-08-14 PROCEDURE — 96376 TX/PRO/DX INJ SAME DRUG ADON: CPT

## 2023-08-14 PROCEDURE — 93005 ELECTROCARDIOGRAM TRACING: CPT | Performed by: INTERNAL MEDICINE

## 2023-08-14 PROCEDURE — 81003 URINALYSIS AUTO W/O SCOPE: CPT | Performed by: EMERGENCY MEDICINE

## 2023-08-14 PROCEDURE — 99285 EMERGENCY DEPT VISIT HI MDM: CPT | Mod: 25

## 2023-08-14 PROCEDURE — 83735 ASSAY OF MAGNESIUM: CPT | Performed by: EMERGENCY MEDICINE

## 2023-08-14 PROCEDURE — 80053 COMPREHEN METABOLIC PANEL: CPT | Performed by: EMERGENCY MEDICINE

## 2023-08-14 PROCEDURE — 80307 DRUG TEST PRSMV CHEM ANLYZR: CPT | Performed by: EMERGENCY MEDICINE

## 2023-08-14 PROCEDURE — 63600175 PHARM REV CODE 636 W HCPCS: Performed by: EMERGENCY MEDICINE

## 2023-08-14 PROCEDURE — 93010 ELECTROCARDIOGRAM REPORT: CPT | Mod: ,,, | Performed by: INTERNAL MEDICINE

## 2023-08-14 RX ORDER — HYDROCHLOROTHIAZIDE 12.5 MG/1
12.5 TABLET ORAL DAILY
Qty: 30 TABLET | Refills: 11 | Status: SHIPPED | OUTPATIENT
Start: 2023-08-14 | End: 2024-08-13

## 2023-08-14 RX ORDER — AMLODIPINE BESYLATE 5 MG/1
10 TABLET ORAL DAILY
Qty: 30 TABLET | Refills: 0 | Status: SHIPPED | OUTPATIENT
Start: 2023-08-14 | End: 2024-08-13

## 2023-08-14 RX ORDER — AMLODIPINE BESYLATE 5 MG/1
10 TABLET ORAL
Status: COMPLETED | OUTPATIENT
Start: 2023-08-14 | End: 2023-08-14

## 2023-08-14 RX ORDER — HYDROCHLOROTHIAZIDE 12.5 MG/1
12.5 TABLET ORAL DAILY
Status: DISCONTINUED | OUTPATIENT
Start: 2023-08-15 | End: 2023-08-14 | Stop reason: HOSPADM

## 2023-08-14 RX ORDER — POTASSIUM CHLORIDE 20 MEQ/1
40 TABLET, EXTENDED RELEASE ORAL
Status: COMPLETED | OUTPATIENT
Start: 2023-08-14 | End: 2023-08-14

## 2023-08-14 RX ORDER — HYDRALAZINE HYDROCHLORIDE 20 MG/ML
5 INJECTION INTRAMUSCULAR; INTRAVENOUS
Status: COMPLETED | OUTPATIENT
Start: 2023-08-14 | End: 2023-08-14

## 2023-08-14 RX ORDER — BUTALBITAL, ACETAMINOPHEN AND CAFFEINE 50; 325; 40 MG/1; MG/1; MG/1
1 TABLET ORAL EVERY 4 HOURS PRN
Status: DISCONTINUED | OUTPATIENT
Start: 2023-08-14 | End: 2023-08-14 | Stop reason: HOSPADM

## 2023-08-14 RX ADMIN — BUTALBITAL, ACETAMINOPHEN AND CAFFEINE 1 TABLET: 325; 50; 40 TABLET ORAL at 02:08

## 2023-08-14 RX ADMIN — AMLODIPINE BESYLATE 10 MG: 5 TABLET ORAL at 06:08

## 2023-08-14 RX ADMIN — POTASSIUM CHLORIDE 40 MEQ: 1500 TABLET, EXTENDED RELEASE ORAL at 02:08

## 2023-08-14 RX ADMIN — BUTALBITAL, ACETAMINOPHEN AND CAFFEINE 1 TABLET: 325; 50; 40 TABLET ORAL at 06:08

## 2023-08-14 RX ADMIN — HYDRALAZINE HYDROCHLORIDE 5 MG: 20 INJECTION INTRAMUSCULAR; INTRAVENOUS at 02:08

## 2023-08-14 RX ADMIN — HYDRALAZINE HYDROCHLORIDE 5 MG: 20 INJECTION INTRAMUSCULAR; INTRAVENOUS at 03:08

## 2023-08-14 NOTE — ED PROVIDER NOTES
Encounter Date: 8/14/2023       History     Chief Complaint   Patient presents with    Dizziness     ONSET LASTNITE, WORSE WITH DIFFERENT POSITIONS    Nausea    Headache     ONSET YESTERDAY 8 PM.     Medication Refill     OUT OF BP MEDS X 3 DAYS     Patient presents emergency department reported high blood pressure states he has been out of his blood pressure medications for a week states began having dizziness last night with associated nausea and headache symptoms gradually worsened he presents emergency department for further evaluation patient states that he is on amlodipine and hydrochlorothiazide for blood pressure control there is no reported chest pain or shortness of breath he reports he did take something for his headache last night but otherwise takes no other regular medications        Review of patient's allergies indicates:  No Known Allergies  Past Medical History:   Diagnosis Date    Hypertension      Past Surgical History:   Procedure Laterality Date    ANKLE SURGERY      TYMPANOSTOMY TUBE PLACEMENT       Family History   Problem Relation Age of Onset    Asthma Mother     Diabetes Father      Social History     Tobacco Use    Smoking status: Some Days     Current packs/day: 0.00     Types: Cigarettes   Substance Use Topics    Alcohol use: Yes     Comment: sometimes    Drug use: No     Review of Systems   Respiratory:  Negative for shortness of breath.    Cardiovascular:  Negative for chest pain and leg swelling.   Gastrointestinal:  Positive for nausea. Negative for abdominal pain, blood in stool, diarrhea and vomiting.   Neurological:  Positive for dizziness and headaches.   All other systems reviewed and are negative.      Physical Exam     Initial Vitals [08/14/23 0945]   BP Pulse Resp Temp SpO2   (!) 184/132 86 16 98.4 °F (36.9 °C) 98 %      MAP       --         Physical Exam    Constitutional: He appears well-developed and well-nourished. No distress.   HENT:   Head: Normocephalic and  atraumatic.   Right Ear: External ear normal.   Left Ear: External ear normal.   Mouth/Throat: Oropharynx is clear and moist.   Eyes: Pupils are equal, round, and reactive to light.   Neck: Neck supple.   Normal range of motion.  Cardiovascular:  Normal rate, regular rhythm, S1 normal, S2 normal, normal heart sounds and intact distal pulses.           Pulmonary/Chest: Breath sounds normal.   Abdominal: Abdomen is soft. Bowel sounds are normal. There is no abdominal tenderness.   Musculoskeletal:         General: Normal range of motion.      Cervical back: Normal range of motion and neck supple.     Neurological: He is alert and oriented to person, place, and time. He has normal strength. No cranial nerve deficit. GCS score is 15. GCS eye subscore is 4. GCS verbal subscore is 5. GCS motor subscore is 6.   Skin: Skin is warm and dry. No rash noted.   Psychiatric: He has a normal mood and affect. His behavior is normal.         ED Course   Procedures  Labs Reviewed   COMPREHENSIVE METABOLIC PANEL - Abnormal; Notable for the following components:       Result Value    Potassium 2.8 (*)     Total Protein 8.8 (*)     All other components within normal limits    Narrative:     Potassium critical result(s) called and verbal readback obtained from   Edgar Rivers RN  by HS3 08/14/2023 10:57   TROPONIN I HIGH SENSITIVITY - Abnormal; Notable for the following components:    Troponin I High Sensitivity 22.9 (*)     All other components within normal limits   URINALYSIS, REFLEX TO URINE CULTURE - Abnormal; Notable for the following components:    Color, UA Colorless (*)     All other components within normal limits    Narrative:     Specimen Source->Urine   TROPONIN I HIGH SENSITIVITY - Abnormal; Notable for the following components:    Troponin I High Sensitivity 21.3 (*)     All other components within normal limits   MAGNESIUM   CBC W/ AUTO DIFFERENTIAL   B-TYPE NATRIURETIC PEPTIDE   DRUG SCREEN PANEL, URINE EMERGENCY     Narrative:     Specimen Source->Urine        ECG Results              EKG 12-lead (In process)  Result time 08/14/23 10:28:03      In process by Interface, Lab In [facility] (08/14/23 10:28:03)                   Narrative:    Test Reason : R07.9,    Vent. Rate : 073 BPM     Atrial Rate : 073 BPM     P-R Int : 136 ms          QRS Dur : 076 ms      QT Int : 384 ms       P-R-T Axes : 022 000 -74 degrees     QTc Int : 423 ms    Normal sinus rhythm  T wave abnormality, consider inferolateral ischemia  Abnormal ECG  When compared with ECG of 13-JUN-2023 12:50,  T wave inversion now evident in Lateral leads    Referred By: [email or web address]   SELF           Confirmed By:                                   Imaging Results              X-Ray Chest AP Portable (Final result)  Result time 08/14/23 11:08:15      Final result by Williams Villa MD (08/14/23 11:08:15)                   Narrative:    HISTORY: Chest pain,  dizziness and nausea.    FINDINGS: Portable chest radiograph at 1038 hours compared to 06/13/2023 shows the cardiomediastinal silhouette and pulmonary vasculature are within normal limits.    The lungs are normally expanded, with no consolidation, large pleural effusion, or evidence of pulmonary edema. No confluent infiltrates or pneumothorax. There are no significant osseous abnormalities.    IMPRESSION: No evidence of active cardiopulmonary disease.    Electronically signed by:  Williams Villa MD  8/14/2023 11:08 AM CDT Workstation: [id number]                                     CT Head Without Contrast (Final result)  Result time 08/14/23 10:47:46      Final result by Nico Alanis MD (08/14/23 10:47:46)                   Narrative:    CMS MANDATED QUALITY DATA - CT RADIATION 436    All CT scans at this facility utilize dose modulation, iterative reconstruction, and/or weight based dosing when appropriate to reduce radiation dose to as low as reasonably achievable.    CLINICAL HISTORY:  43 years (1980) Male  Headache, chronic, new features or increased frequency Dizziness (ONSET LASTNITE, WORSE WITH DIFFERENT POSITIONS); Nausea; Headache (ONSET YESTERDAY 8 PM. ); Medication Refill (OUT OF BP MEDS X 3 DAYS)    TECHNIQUE:  CT HEAD WITHOUT IV CONTRAST. Axial CT of the brain without contrast using soft tissue and bone algorithm. Please note in the acute setting if there is a clinical concern for an acute stroke MRI would be more sensitive/specific for evaluation of ischemia.    COMPARISON:  CT from May 10, 2023.    FINDINGS:  No acute intracranial hemorrhage, hydrocephalus, herniation or midline shift and the basal and suprasellar cisterns are patent. No acute skull fracture is identified. The ventricles and sulci are normal. There is normal gray white differentiation.  Orbital contents appear within normal limits. External auditory canals are unremarkable. The visualized paranasal sinuses and mastoid air cells are essentially clear.    IMPRESSION:  No acute intracranial process                  .    Electronically signed by:  Nico Alanis MD  8/14/2023 10:47 AM CDT Workstation: [id number]                                     Medications   butalbital-acetaminophen-caffeine -40 mg per tablet 1 tablet (1 tablet Oral Given 8/14/23 1819)   hydroCHLOROthiazide tablet 12.5 mg (has no administration in time range)   potassium chloride SA CR tablet 40 mEq (40 mEq Oral Given 8/14/23 1404)   hydrALAZINE injection 5 mg (5 mg Intravenous Given 8/14/23 1405)   hydrALAZINE injection 5 mg (5 mg Intravenous Given 8/14/23 1544)   amLODIPine tablet 10 mg (10 mg Oral Given 8/14/23 1819)     Medical Decision Making:   Clinical Tests:   Lab Tests: Ordered and Reviewed  Radiological Study: Ordered and Reviewed  Medical Tests: Ordered and Reviewed  ED Management:  Patient's blood pressure improved in the emergency department spontaneously there was some lability to the blood pressures given 2 doses of hydralazine with improvement in the  blood pressure I have given him his dose of Norvasc and hydrochlorothiazide I have represcribed these medications for him recommend he follow-up with his primary care provider before the prescriptions run out to obtained continues prescriptions return to ER for any worsened symptoms or new symptoms or concerns                          Clinical Impression:   Final diagnoses:  [I10] Hypertension, unspecified type (Primary)        ED Disposition Condition    Discharge Stable          ED Prescriptions       Medication Sig Dispense Start Date End Date Auth. Provider    amLODIPine (NORVASC) 5 MG tablet Take 2 tablets (10 mg total) by mouth once daily. 30 tablet 8/14/2023 8/13/2024 Maurice Arzate MD    hydroCHLOROthiazide (HYDRODIURIL) 12.5 MG Tab Take 1 tablet (12.5 mg total) by mouth once daily. 30 tablet 8/14/2023 8/13/2024 Maurice Arzate MD          Follow-up Information    None          Maurice Arzate MD  08/14/23 1920

## 2023-11-28 ENCOUNTER — OFFICE VISIT (OUTPATIENT)
Dept: URGENT CARE | Facility: CLINIC | Age: 43
End: 2023-11-28
Payer: MEDICAID

## 2023-11-28 VITALS
WEIGHT: 198 LBS | OXYGEN SATURATION: 100 % | BODY MASS INDEX: 29.33 KG/M2 | SYSTOLIC BLOOD PRESSURE: 159 MMHG | DIASTOLIC BLOOD PRESSURE: 107 MMHG | HEIGHT: 69 IN | RESPIRATION RATE: 16 BRPM | HEART RATE: 84 BPM | TEMPERATURE: 99 F

## 2023-11-28 DIAGNOSIS — L03.011 PARONYCHIA OF FINGER, RIGHT: Primary | ICD-10-CM

## 2023-11-28 PROCEDURE — 99214 OFFICE O/P EST MOD 30 MIN: CPT | Mod: 25,S$GLB,, | Performed by: NURSE PRACTITIONER

## 2023-11-28 PROCEDURE — 26010 INCISION & DRAINAGE: ICD-10-PCS | Mod: F6,S$GLB,, | Performed by: NURSE PRACTITIONER

## 2023-11-28 PROCEDURE — 99214 PR OFFICE/OUTPT VISIT, EST, LEVL IV, 30-39 MIN: ICD-10-PCS | Mod: 25,S$GLB,, | Performed by: NURSE PRACTITIONER

## 2023-11-28 PROCEDURE — 90714 TD VACC NO PRESV 7 YRS+ IM: CPT | Mod: S$GLB,,, | Performed by: EMERGENCY MEDICINE

## 2023-11-28 PROCEDURE — 26010 DRAINAGE OF FINGER ABSCESS: CPT | Mod: F6,S$GLB,, | Performed by: NURSE PRACTITIONER

## 2023-11-28 PROCEDURE — 90714 TD VACCINE GREATER THAN OR EQUAL TO 7YO WITH PRESERVATIVE IM: ICD-10-PCS | Mod: S$GLB,,, | Performed by: EMERGENCY MEDICINE

## 2023-11-28 PROCEDURE — 90471 TD VACCINE GREATER THAN OR EQUAL TO 7YO WITH PRESERVATIVE IM: ICD-10-PCS | Mod: S$GLB,,, | Performed by: EMERGENCY MEDICINE

## 2023-11-28 PROCEDURE — 90471 IMMUNIZATION ADMIN: CPT | Mod: S$GLB,,, | Performed by: EMERGENCY MEDICINE

## 2023-11-28 RX ORDER — HYDROCODONE BITARTRATE AND ACETAMINOPHEN 5; 325 MG/1; MG/1
1 TABLET ORAL EVERY 6 HOURS PRN
Qty: 8 TABLET | Refills: 0 | Status: SHIPPED | OUTPATIENT
Start: 2023-11-28

## 2023-11-28 RX ORDER — MUPIROCIN 20 MG/G
OINTMENT TOPICAL 3 TIMES DAILY
Qty: 22 G | Refills: 0 | Status: SHIPPED | OUTPATIENT
Start: 2023-11-28

## 2023-11-28 RX ORDER — SULFAMETHOXAZOLE AND TRIMETHOPRIM 800; 160 MG/1; MG/1
1 TABLET ORAL 2 TIMES DAILY
Qty: 14 TABLET | Refills: 0 | Status: SHIPPED | OUTPATIENT
Start: 2023-11-28 | End: 2023-12-05

## 2023-11-28 NOTE — PROCEDURES
"Incision & Drainage    Date/Time: 11/28/2023 3:40 PM    Performed by: Mirlande Burton NP  Authorized by: Mirlande Burton NP    Time out: Immediately prior to procedure a "time out" was called to verify the correct patient, procedure, equipment, support staff and site/side marked as required.    Consent Done?:  Yes (Written)    Type:  Abscess  Body area:  Upper extremity  Location details:  Right index finger  Anesthesia:  Digital block  Local anesthetic: Lidocaine 1% without epinephrine  Anesthetic total (ml):  2  Scalpel size: 18 gauge needle.  Incision depth: subcutaneous    Complexity:  Simple  Drainage:  Bloody and purulent  Drainage amount:  Scant  Wound treatment:  Incision, drainage and expression of material  Packing material:  None  Patient tolerance:  Patient tolerated the procedure well with no immediate complications    "

## 2023-11-28 NOTE — PATIENT INSTRUCTIONS
Tylenol over-the-counter as directed for pain, Norco for breakthrough pain not relieved by Tylenol alone.  Do not take Tylenol and Norco together as this could result in an overdose of the acetaminophen component that is also found in Norco   Keep the right hand elevated as much as possible.    Ice to the affected finger today and tonight to help with swelling in pain however tomorrow in need to start soaking the finger in warm to hot antibacterial soap and water for 15-20 minutes at least 4 times a day until symptoms have significantly improved then just as needed.   Allowed to air dry well then apply mupirocin ointment prescribed in cover with a clean new dressing  Return to clinic or seek medical re-evaluation if symptoms worsen or fail to improve after 48-72 hours on antibiotics.    Bactrim twice a day for 7 days.    Culture obtained today will be sent to the lab for analysis.  Expect results in 3-7 days.  You can watch for the results to come across the [facility] MyChart or someone from the clinic will be contacting you with results once we have received them and had time to review them

## 2023-11-28 NOTE — PROGRESS NOTES
"Subjective:      Patient ID: Rodolfo Rutledge is a 43 y.o. male.    Vitals:  height is 5' 9" (1.753 m) and weight is 89.8 kg (198 lb). His temperature is 99.1 °F (37.3 °C). His blood pressure is 159/107 (abnormal) and his pulse is 84. His respiration is 16 and oxygen saturation is 100%.     Chief Complaint: Hand Pain (Right Index finger)     Onset of symptoms 5 days.   Unknown mechanism of injury or exposure.  He has attempted to puncture the site several times with diabetic needle and at 1 point was able to express some thick green pus from underneath the cuticle.    Hand Pain   Incident onset: x 5 days. Pertinent negatives include no numbness.       Constitution: Negative for chills and fever.   Gastrointestinal:  Negative for vomiting and diarrhea.   Musculoskeletal:  Positive for pain and joint swelling (tip of right index finger). Negative for trauma and abnormal ROM of joint.   Neurological:  Negative for numbness and tingling.      Objective:     Physical Exam   Constitutional: He is oriented to person, place, and time.  Non-toxic appearance. He does not appear ill. No distress.   HENT:   Head: Normocephalic and atraumatic.   Nose: Nose normal.   Mouth/Throat: Mucous membranes are moist.   Eyes: Conjunctivae are normal.   Cardiovascular: Normal rate.   Pulmonary/Chest: Effort normal. No respiratory distress.   Abdominal: Normal appearance.   Musculoskeletal: Normal range of motion.         General: Swelling and tenderness present. Normal range of motion.      Comments:  Fluctuance and swelling to distal right index finger ulnar aspect.  See attached photos   Neurological: no focal deficit. He is alert and oriented to person, place, and time.   Skin: Skin is warm, dry and not diaphoretic. Capillary refill takes 2 to 3 seconds. No bruising and No lesion   Psychiatric: His behavior is normal. Mood normal.   Nursing note and vitals reviewed.              Assessment:     1. Paronychia of finger, right       " Tetanus booster updated today in clinic.    Plan:       Paronychia of finger, right  -     (In Office Administered) Td Vaccine  -     sulfamethoxazole-trimethoprim 800-160mg (BACTRIM DS) 800-160 mg Tab; Take 1 tablet by mouth 2 (two) times daily. for 7 days  Dispense: 14 tablet; Refill: 0  -     CULTURE, AEROBIC  (SPECIFY SOURCE)  -     HYDROcodone-acetaminophen (NORCO) 5-325 mg per tablet; Take 1 tablet by mouth every 6 (six) hours as needed for Pain.  Dispense: 8 tablet; Refill: 0  -     mupirocin (BACTROBAN) 2 % ointment; Apply topically 3 (three) times daily.  Dispense: 22 g; Refill: 0  -     Incision & Drainage

## 2023-12-04 LAB
BACTERIA SPEC CULT: ABNORMAL
BACTERIA SPEC CULT: ABNORMAL
MICROORGANISM/AGENT SPEC: ABNORMAL
OTHER ANTIBIOTIC SUSC ISLT: ABNORMAL

## 2024-12-20 ENCOUNTER — OCCUPATIONAL HEALTH (OUTPATIENT)
Dept: URGENT CARE | Facility: CLINIC | Age: 44
End: 2024-12-20
Payer: MEDICAID

## 2024-12-20 DIAGNOSIS — Z00.00 ROUTINE GENERAL MEDICAL EXAMINATION AT A HEALTH CARE FACILITY: Primary | ICD-10-CM

## 2025-01-03 ENCOUNTER — OCCUPATIONAL HEALTH (OUTPATIENT)
Dept: URGENT CARE | Facility: CLINIC | Age: 45
End: 2025-01-03

## 2025-01-03 DIAGNOSIS — Z02.83 ENCOUNTER FOR EMPLOYMENT-RELATED DRUG TESTING: Primary | ICD-10-CM

## 2025-03-21 ENCOUNTER — HOSPITAL ENCOUNTER (OUTPATIENT)
Facility: HOSPITAL | Age: 45
Discharge: HOME | End: 2025-03-23
Attending: STUDENT IN AN ORGANIZED HEALTH CARE EDUCATION/TRAINING PROGRAM | Admitting: INTERNAL MEDICINE
Payer: MEDICAID

## 2025-03-21 DIAGNOSIS — K37 APPENDICITIS: Primary | ICD-10-CM

## 2025-03-21 DIAGNOSIS — R07.9 CHEST PAIN: ICD-10-CM

## 2025-03-21 PROBLEM — E87.6 HYPOKALEMIA: Status: ACTIVE | Noted: 2025-03-21

## 2025-03-21 PROBLEM — I10 HYPERTENSION: Status: ACTIVE | Noted: 2025-03-21

## 2025-03-21 PROBLEM — N17.9 AKI (ACUTE KIDNEY INJURY): Status: ACTIVE | Noted: 2025-03-21

## 2025-03-21 LAB
ALBUMIN SERPL BCP-MCNC: 4.1 G/DL (ref 3.5–5.2)
ALP SERPL-CCNC: 72 U/L (ref 55–135)
ALT SERPL W/O P-5'-P-CCNC: 11 U/L (ref 10–44)
ANION GAP SERPL CALC-SCNC: 7 MMOL/L (ref 8–16)
AST SERPL-CCNC: 14 U/L (ref 10–40)
BASOPHILS # BLD AUTO: 0.03 K/UL (ref 0–0.2)
BASOPHILS NFR BLD: 0.2 % (ref 0–1.9)
BILIRUB SERPL-MCNC: 0.9 MG/DL (ref 0.1–1)
BUN SERPL-MCNC: 14 MG/DL (ref 6–20)
CALCIUM SERPL-MCNC: 9.7 MG/DL (ref 8.7–10.5)
CHLORIDE SERPL-SCNC: 103 MMOL/L (ref 95–110)
CO2 SERPL-SCNC: 29 MMOL/L (ref 23–29)
CREAT SERPL-MCNC: 1.7 MG/DL (ref 0.5–1.4)
CREAT SERPL-MCNC: 1.7 MG/DL (ref 0.5–1.4)
DIFFERENTIAL METHOD BLD: ABNORMAL
EOSINOPHIL # BLD AUTO: 0 K/UL (ref 0–0.5)
EOSINOPHIL NFR BLD: 0.1 % (ref 0–8)
ERYTHROCYTE [DISTWIDTH] IN BLOOD BY AUTOMATED COUNT: 16 % (ref 11.5–14.5)
EST. GFR  (NO RACE VARIABLE): 50.7 ML/MIN/1.73 M^2
GLUCOSE SERPL-MCNC: 106 MG/DL (ref 70–110)
HCT VFR BLD AUTO: 41.6 % (ref 40–54)
HGB BLD-MCNC: 13.4 G/DL (ref 14–18)
IMM GRANULOCYTES # BLD AUTO: 0.11 K/UL (ref 0–0.04)
IMM GRANULOCYTES NFR BLD AUTO: 0.6 % (ref 0–0.5)
LIPASE SERPL-CCNC: 16 U/L (ref 4–60)
LYMPHOCYTES # BLD AUTO: 1.2 K/UL (ref 1–4.8)
LYMPHOCYTES NFR BLD: 6.2 % (ref 18–48)
MCH RBC QN AUTO: 27 PG (ref 27–31)
MCHC RBC AUTO-ENTMCNC: 32.2 G/DL (ref 32–36)
MCV RBC AUTO: 84 FL (ref 82–98)
MONOCYTES # BLD AUTO: 1.1 K/UL (ref 0.3–1)
MONOCYTES NFR BLD: 5.7 % (ref 4–15)
NEUTROPHILS # BLD AUTO: 17.4 K/UL (ref 1.8–7.7)
NEUTROPHILS NFR BLD: 87.2 % (ref 38–73)
NRBC BLD-RTO: 0 /100 WBC
PLATELET # BLD AUTO: 264 K/UL (ref 150–450)
PMV BLD AUTO: 11.1 FL (ref 9.2–12.9)
POTASSIUM SERPL-SCNC: 3.4 MMOL/L (ref 3.5–5.1)
PROT SERPL-MCNC: 7.8 G/DL (ref 6–8.4)
RBC # BLD AUTO: 4.96 M/UL (ref 4.6–6.2)
SAMPLE: ABNORMAL
SODIUM SERPL-SCNC: 139 MMOL/L (ref 136–145)
WBC # BLD AUTO: 19.88 K/UL (ref 3.9–12.7)

## 2025-03-21 PROCEDURE — 82565 ASSAY OF CREATININE: CPT

## 2025-03-21 PROCEDURE — 85025 COMPLETE CBC W/AUTO DIFF WBC: CPT | Performed by: EMERGENCY MEDICINE

## 2025-03-21 PROCEDURE — 96361 HYDRATE IV INFUSION ADD-ON: CPT

## 2025-03-21 PROCEDURE — 80053 COMPREHEN METABOLIC PANEL: CPT | Performed by: EMERGENCY MEDICINE

## 2025-03-21 PROCEDURE — 25500020 PHARM REV CODE 255: Performed by: STUDENT IN AN ORGANIZED HEALTH CARE EDUCATION/TRAINING PROGRAM

## 2025-03-21 PROCEDURE — 83690 ASSAY OF LIPASE: CPT | Performed by: EMERGENCY MEDICINE

## 2025-03-21 PROCEDURE — 96365 THER/PROPH/DIAG IV INF INIT: CPT

## 2025-03-21 PROCEDURE — 99285 EMERGENCY DEPT VISIT HI MDM: CPT | Mod: 25

## 2025-03-21 PROCEDURE — 96376 TX/PRO/DX INJ SAME DRUG ADON: CPT

## 2025-03-21 PROCEDURE — 96375 TX/PRO/DX INJ NEW DRUG ADDON: CPT

## 2025-03-21 PROCEDURE — 63600175 PHARM REV CODE 636 W HCPCS: Mod: TB,JZ | Performed by: STUDENT IN AN ORGANIZED HEALTH CARE EDUCATION/TRAINING PROGRAM

## 2025-03-21 PROCEDURE — 25000003 PHARM REV CODE 250: Performed by: STUDENT IN AN ORGANIZED HEALTH CARE EDUCATION/TRAINING PROGRAM

## 2025-03-21 RX ORDER — LANOLIN ALCOHOL/MO/W.PET/CERES
800 CREAM (GRAM) TOPICAL
Status: DISCONTINUED | OUTPATIENT
Start: 2025-03-22 | End: 2025-03-23 | Stop reason: HOSPADM

## 2025-03-21 RX ORDER — LANOLIN ALCOHOL/MO/W.PET/CERES
400 CREAM (GRAM) TOPICAL ONCE
Status: COMPLETED | OUTPATIENT
Start: 2025-03-22 | End: 2025-03-21

## 2025-03-21 RX ORDER — IBUPROFEN 200 MG
16 TABLET ORAL
Status: DISCONTINUED | OUTPATIENT
Start: 2025-03-22 | End: 2025-03-22

## 2025-03-21 RX ORDER — HYDROMORPHONE HYDROCHLORIDE 1 MG/ML
0.5 INJECTION, SOLUTION INTRAMUSCULAR; INTRAVENOUS; SUBCUTANEOUS
Refills: 0 | Status: COMPLETED | OUTPATIENT
Start: 2025-03-21 | End: 2025-03-21

## 2025-03-21 RX ORDER — SODIUM CHLORIDE, SODIUM LACTATE, POTASSIUM CHLORIDE, CALCIUM CHLORIDE 600; 310; 30; 20 MG/100ML; MG/100ML; MG/100ML; MG/100ML
1000 INJECTION, SOLUTION INTRAVENOUS CONTINUOUS
Status: DISCONTINUED | OUTPATIENT
Start: 2025-03-22 | End: 2025-03-21

## 2025-03-21 RX ORDER — SODIUM CHLORIDE 9 MG/ML
1000 INJECTION, SOLUTION INTRAVENOUS
Status: DISCONTINUED | OUTPATIENT
Start: 2025-03-21 | End: 2025-03-21

## 2025-03-21 RX ORDER — SODIUM,POTASSIUM PHOSPHATES 280-250MG
2 POWDER IN PACKET (EA) ORAL
Status: DISCONTINUED | OUTPATIENT
Start: 2025-03-22 | End: 2025-03-23 | Stop reason: HOSPADM

## 2025-03-21 RX ORDER — ONDANSETRON HYDROCHLORIDE 2 MG/ML
4 INJECTION, SOLUTION INTRAVENOUS
Status: COMPLETED | OUTPATIENT
Start: 2025-03-21 | End: 2025-03-21

## 2025-03-21 RX ORDER — GLUCAGON 1 MG
1 KIT INJECTION
Status: DISCONTINUED | OUTPATIENT
Start: 2025-03-22 | End: 2025-03-22

## 2025-03-21 RX ORDER — IBUPROFEN 200 MG
24 TABLET ORAL
Status: DISCONTINUED | OUTPATIENT
Start: 2025-03-22 | End: 2025-03-22

## 2025-03-21 RX ORDER — ALUMINUM HYDROXIDE, MAGNESIUM HYDROXIDE, AND SIMETHICONE 1200; 120; 1200 MG/30ML; MG/30ML; MG/30ML
30 SUSPENSION ORAL 4 TIMES DAILY PRN
Status: DISCONTINUED | OUTPATIENT
Start: 2025-03-22 | End: 2025-03-23 | Stop reason: HOSPADM

## 2025-03-21 RX ORDER — ACETAMINOPHEN 325 MG/1
650 TABLET ORAL EVERY 8 HOURS PRN
Status: DISCONTINUED | OUTPATIENT
Start: 2025-03-22 | End: 2025-03-22

## 2025-03-21 RX ORDER — ONDANSETRON HYDROCHLORIDE 2 MG/ML
4 INJECTION, SOLUTION INTRAVENOUS EVERY 6 HOURS PRN
Status: DISCONTINUED | OUTPATIENT
Start: 2025-03-22 | End: 2025-03-23 | Stop reason: HOSPADM

## 2025-03-21 RX ORDER — SODIUM CHLORIDE 0.9 % (FLUSH) 0.9 %
10 SYRINGE (ML) INJECTION EVERY 12 HOURS PRN
Status: DISCONTINUED | OUTPATIENT
Start: 2025-03-22 | End: 2025-03-23 | Stop reason: HOSPADM

## 2025-03-21 RX ORDER — AMLODIPINE BESYLATE 5 MG/1
10 TABLET ORAL DAILY
Status: DISCONTINUED | OUTPATIENT
Start: 2025-03-22 | End: 2025-03-23 | Stop reason: HOSPADM

## 2025-03-21 RX ORDER — LOSARTAN POTASSIUM 25 MG/1
25 TABLET ORAL DAILY
COMMUNITY

## 2025-03-21 RX ORDER — AMOXICILLIN 250 MG
1 CAPSULE ORAL 2 TIMES DAILY PRN
Status: DISCONTINUED | OUTPATIENT
Start: 2025-03-22 | End: 2025-03-21

## 2025-03-21 RX ORDER — HYDROCODONE BITARTRATE AND ACETAMINOPHEN 5; 325 MG/1; MG/1
1 TABLET ORAL EVERY 6 HOURS PRN
Refills: 0 | Status: DISCONTINUED | OUTPATIENT
Start: 2025-03-22 | End: 2025-03-23

## 2025-03-21 RX ORDER — SODIUM CHLORIDE, SODIUM LACTATE, POTASSIUM CHLORIDE, CALCIUM CHLORIDE 600; 310; 30; 20 MG/100ML; MG/100ML; MG/100ML; MG/100ML
1000 INJECTION, SOLUTION INTRAVENOUS CONTINUOUS
Status: ACTIVE | OUTPATIENT
Start: 2025-03-22 | End: 2025-03-23

## 2025-03-21 RX ORDER — ACETAMINOPHEN 325 MG/1
650 TABLET ORAL EVERY 4 HOURS PRN
Status: DISCONTINUED | OUTPATIENT
Start: 2025-03-22 | End: 2025-03-23 | Stop reason: HOSPADM

## 2025-03-21 RX ORDER — HYDROMORPHONE HYDROCHLORIDE 1 MG/ML
0.5 INJECTION, SOLUTION INTRAMUSCULAR; INTRAVENOUS; SUBCUTANEOUS EVERY 4 HOURS PRN
Status: DISCONTINUED | OUTPATIENT
Start: 2025-03-22 | End: 2025-03-22

## 2025-03-21 RX ORDER — HYDRALAZINE HYDROCHLORIDE 25 MG/1
25 TABLET, FILM COATED ORAL EVERY 8 HOURS PRN
Status: DISCONTINUED | OUTPATIENT
Start: 2025-03-22 | End: 2025-03-22

## 2025-03-21 RX ORDER — NALOXONE HCL 0.4 MG/ML
0.02 VIAL (ML) INJECTION
Status: DISCONTINUED | OUTPATIENT
Start: 2025-03-22 | End: 2025-03-23 | Stop reason: HOSPADM

## 2025-03-21 RX ORDER — TALC
6 POWDER (GRAM) TOPICAL NIGHTLY PRN
Status: DISCONTINUED | OUTPATIENT
Start: 2025-03-22 | End: 2025-03-23 | Stop reason: HOSPADM

## 2025-03-21 RX ADMIN — POTASSIUM BICARBONATE 20 MEQ: 782 TABLET, EFFERVESCENT ORAL at 11:03

## 2025-03-21 RX ADMIN — PIPERACILLIN SODIUM AND TAZOBACTAM SODIUM 4.5 G: 4; .5 INJECTION, POWDER, LYOPHILIZED, FOR SOLUTION INTRAVENOUS at 10:03

## 2025-03-21 RX ADMIN — IOHEXOL 100 ML: 350 INJECTION, SOLUTION INTRAVENOUS at 10:03

## 2025-03-21 RX ADMIN — HYDROMORPHONE HYDROCHLORIDE 0.5 MG: 1 INJECTION, SOLUTION INTRAMUSCULAR; INTRAVENOUS; SUBCUTANEOUS at 09:03

## 2025-03-21 RX ADMIN — Medication 400 MG: at 11:03

## 2025-03-21 RX ADMIN — SODIUM CHLORIDE, POTASSIUM CHLORIDE, SODIUM LACTATE AND CALCIUM CHLORIDE 500 ML: 600; 310; 30; 20 INJECTION, SOLUTION INTRAVENOUS at 10:03

## 2025-03-21 RX ADMIN — ONDANSETRON 4 MG: 2 INJECTION INTRAMUSCULAR; INTRAVENOUS at 11:03

## 2025-03-21 RX ADMIN — HYDROMORPHONE HYDROCHLORIDE 0.5 MG: 1 INJECTION, SOLUTION INTRAMUSCULAR; INTRAVENOUS; SUBCUTANEOUS at 11:03

## 2025-03-22 ENCOUNTER — ANESTHESIA EVENT (OUTPATIENT)
Dept: SURGERY | Facility: HOSPITAL | Age: 45
End: 2025-03-22
Payer: MEDICAID

## 2025-03-22 ENCOUNTER — ANESTHESIA (OUTPATIENT)
Dept: SURGERY | Facility: HOSPITAL | Age: 45
End: 2025-03-22
Payer: MEDICAID

## 2025-03-22 PROBLEM — K21.9 GERD (GASTROESOPHAGEAL REFLUX DISEASE): Status: ACTIVE | Noted: 2025-03-22

## 2025-03-22 PROBLEM — D64.9 ANEMIA: Status: ACTIVE | Noted: 2025-03-22

## 2025-03-22 PROBLEM — F17.200 TOBACCO DEPENDENCE: Status: ACTIVE | Noted: 2025-03-22

## 2025-03-22 PROBLEM — F10.20 ETOH DEPENDENCE: Status: ACTIVE | Noted: 2025-03-22

## 2025-03-22 LAB
ABSOLUTE EOSINOPHIL (SMH): 0.04 K/UL
ABSOLUTE EOSINOPHIL (SMH): 0.07 K/UL
ABSOLUTE MONOCYTE (SMH): 0.76 K/UL (ref 0.3–1)
ABSOLUTE MONOCYTE (SMH): 1.12 K/UL (ref 0.3–1)
ABSOLUTE NEUTROPHIL COUNT (SMH): 16 K/UL (ref 1.8–7.7)
ABSOLUTE NEUTROPHIL COUNT (SMH): 7.9 K/UL (ref 1.8–7.7)
ANION GAP (SMH): 4 MMOL/L (ref 8–16)
ANION GAP (SMH): 7 MMOL/L (ref 8–16)
APTT PPP: 23.5 SECONDS (ref 21–32)
BASOPHILS # BLD AUTO: 0.02 K/UL
BASOPHILS # BLD AUTO: 0.03 K/UL
BASOPHILS NFR BLD AUTO: 0.2 %
BASOPHILS NFR BLD AUTO: 0.2 %
BILIRUB UR QL STRIP.AUTO: NEGATIVE
BUN SERPL-MCNC: 14 MG/DL (ref 6–20)
BUN SERPL-MCNC: 14 MG/DL (ref 6–20)
CALCIUM SERPL-MCNC: 8.8 MG/DL (ref 8.7–10.5)
CALCIUM SERPL-MCNC: 9.4 MG/DL (ref 8.7–10.5)
CHLORIDE SERPL-SCNC: 102 MMOL/L (ref 95–110)
CHLORIDE SERPL-SCNC: 97 MMOL/L (ref 95–110)
CLARITY UR: CLEAR
CO2 SERPL-SCNC: 28 MMOL/L (ref 23–29)
CO2 SERPL-SCNC: 30 MMOL/L (ref 23–29)
COLOR UR AUTO: YELLOW
CREAT SERPL-MCNC: 1 MG/DL (ref 0.5–1.4)
CREAT SERPL-MCNC: 1.5 MG/DL (ref 0.5–1.4)
ERYTHROCYTE [DISTWIDTH] IN BLOOD BY AUTOMATED COUNT: 13 % (ref 11.5–14.5)
ERYTHROCYTE [DISTWIDTH] IN BLOOD BY AUTOMATED COUNT: 16.3 % (ref 11.5–14.5)
GFR SERPLBLD CREATININE-BSD FMLA CKD-EPI: 59 ML/MIN/1.73/M2
GFR SERPLBLD CREATININE-BSD FMLA CKD-EPI: >60 ML/MIN/1.73/M2
GLUCOSE SERPL-MCNC: 104 MG/DL (ref 70–110)
GLUCOSE SERPL-MCNC: 93 MG/DL (ref 70–110)
GLUCOSE UR QL STRIP: NEGATIVE
HCT VFR BLD AUTO: 37.5 % (ref 40–54)
HCT VFR BLD AUTO: 42.3 % (ref 40–54)
HGB BLD-MCNC: 12.1 GM/DL (ref 14–18)
HGB BLD-MCNC: 14.6 GM/DL (ref 14–18)
HGB UR QL STRIP: NEGATIVE
IMM GRANULOCYTES # BLD AUTO: 0.04 K/UL (ref 0–0.04)
IMM GRANULOCYTES # BLD AUTO: 0.11 K/UL (ref 0–0.04)
IMM GRANULOCYTES NFR BLD AUTO: 0.4 % (ref 0–0.5)
IMM GRANULOCYTES NFR BLD AUTO: 0.6 % (ref 0–0.5)
INR PPP: 1 (ref 0.8–1.2)
KETONES UR QL STRIP: NEGATIVE
LACTATE SERPL-SCNC: 0.6 MMOL/L (ref 0.5–1.9)
LEUKOCYTE ESTERASE UR QL STRIP: NEGATIVE
LYMPHOCYTES # BLD AUTO: 0.99 K/UL (ref 1–4.8)
LYMPHOCYTES # BLD AUTO: 1.23 K/UL (ref 1–4.8)
MAGNESIUM SERPL-MCNC: 1.8 MG/DL (ref 1.6–2.6)
MAGNESIUM SERPL-MCNC: 1.9 MG/DL (ref 1.6–2.6)
MCH RBC QN AUTO: 27.3 PG (ref 27–31)
MCH RBC QN AUTO: 32.1 PG (ref 27–31)
MCHC RBC AUTO-ENTMCNC: 32.3 G/DL (ref 32–36)
MCHC RBC AUTO-ENTMCNC: 34.5 G/DL (ref 32–36)
MCV RBC AUTO: 85 FL (ref 82–98)
MCV RBC AUTO: 93 FL (ref 82–98)
NITRITE UR QL STRIP: NEGATIVE
NUCLEATED RBC (/100WBC) (SMH): 0 /100 WBC
NUCLEATED RBC (/100WBC) (SMH): 0 /100 WBC
PH UR STRIP: 7 [PH]
PHOSPHATE SERPL-MCNC: 2.7 MG/DL (ref 2.7–4.5)
PHOSPHATE SERPL-MCNC: 3 MG/DL (ref 2.7–4.5)
PLATELET # BLD AUTO: 196 K/UL (ref 150–450)
PLATELET # BLD AUTO: 267 K/UL (ref 150–450)
PLATELET BLD QL SMEAR: NORMAL
PMV BLD AUTO: 11.9 FL (ref 9.2–12.9)
PMV BLD AUTO: 9.8 FL (ref 9.2–12.9)
POCT GLUCOSE: 80 MG/DL (ref 70–110)
POTASSIUM SERPL-SCNC: 3.1 MMOL/L (ref 3.5–5.1)
POTASSIUM SERPL-SCNC: 4.4 MMOL/L (ref 3.5–5.1)
PROT UR QL STRIP: ABNORMAL
PROTHROMBIN TIME: 11.3 SECONDS (ref 9–12.5)
RBC # BLD AUTO: 4.43 M/UL (ref 4.6–6.2)
RBC # BLD AUTO: 4.55 M/UL (ref 4.6–6.2)
RELATIVE EOSINOPHIL (SMH): 0.2 % (ref 0–8)
RELATIVE EOSINOPHIL (SMH): 0.7 % (ref 0–8)
RELATIVE LYMPHOCYTE (SMH): 6.8 % (ref 18–48)
RELATIVE LYMPHOCYTE (SMH): 9.7 % (ref 18–48)
RELATIVE MONOCYTE (SMH): 11 % (ref 4–15)
RELATIVE MONOCYTE (SMH): 4.2 % (ref 4–15)
RELATIVE NEUTROPHIL (SMH): 78 % (ref 38–73)
RELATIVE NEUTROPHIL (SMH): 88 % (ref 38–73)
SODIUM SERPL-SCNC: 132 MMOL/L (ref 136–145)
SODIUM SERPL-SCNC: 136 MMOL/L (ref 136–145)
SP GR UR STRIP: >=1.03
UROBILINOGEN UR STRIP-ACNC: NEGATIVE EU/DL
WBC # BLD AUTO: 10.18 K/UL (ref 3.9–12.7)
WBC # BLD AUTO: 18.12 K/UL (ref 3.9–12.7)

## 2025-03-22 PROCEDURE — 25000003 PHARM REV CODE 250

## 2025-03-22 PROCEDURE — 25000003 PHARM REV CODE 250: Performed by: ANESTHESIOLOGY

## 2025-03-22 PROCEDURE — 81002 URINALYSIS NONAUTO W/O SCOPE: CPT | Performed by: INTERNAL MEDICINE

## 2025-03-22 PROCEDURE — 82962 GLUCOSE BLOOD TEST: CPT

## 2025-03-22 PROCEDURE — 63600175 PHARM REV CODE 636 W HCPCS: Performed by: STUDENT IN AN ORGANIZED HEALTH CARE EDUCATION/TRAINING PROGRAM

## 2025-03-22 PROCEDURE — 25000003 PHARM REV CODE 250: Performed by: SURGERY

## 2025-03-22 PROCEDURE — 27202107 HC XP QUATRO SENSOR: Performed by: ANESTHESIOLOGY

## 2025-03-22 PROCEDURE — D9220A PRA ANESTHESIA: Mod: CRNA,,, | Performed by: NURSE ANESTHETIST, CERTIFIED REGISTERED

## 2025-03-22 PROCEDURE — D9220A PRA ANESTHESIA: Mod: ANES,,, | Performed by: ANESTHESIOLOGY

## 2025-03-22 PROCEDURE — 71000039 HC RECOVERY, EACH ADD'L HOUR: Performed by: SURGERY

## 2025-03-22 PROCEDURE — 85025 COMPLETE CBC W/AUTO DIFF WBC: CPT

## 2025-03-22 PROCEDURE — 63600175 PHARM REV CODE 636 W HCPCS

## 2025-03-22 PROCEDURE — 25000003 PHARM REV CODE 250: Performed by: NURSE ANESTHETIST, CERTIFIED REGISTERED

## 2025-03-22 PROCEDURE — 63600175 PHARM REV CODE 636 W HCPCS: Performed by: NURSE ANESTHETIST, CERTIFIED REGISTERED

## 2025-03-22 PROCEDURE — 36415 COLL VENOUS BLD VENIPUNCTURE: CPT

## 2025-03-22 PROCEDURE — 96376 TX/PRO/DX INJ SAME DRUG ADON: CPT

## 2025-03-22 PROCEDURE — 96366 THER/PROPH/DIAG IV INF ADDON: CPT

## 2025-03-22 PROCEDURE — 63600175 PHARM REV CODE 636 W HCPCS: Mod: TB,JZ | Performed by: INTERNAL MEDICINE

## 2025-03-22 PROCEDURE — 44970 LAPAROSCOPY APPENDECTOMY: CPT | Mod: ,,, | Performed by: SURGERY

## 2025-03-22 PROCEDURE — G0378 HOSPITAL OBSERVATION PER HR: HCPCS

## 2025-03-22 PROCEDURE — 71000033 HC RECOVERY, INTIAL HOUR: Performed by: SURGERY

## 2025-03-22 PROCEDURE — 83735 ASSAY OF MAGNESIUM: CPT

## 2025-03-22 PROCEDURE — 96375 TX/PRO/DX INJ NEW DRUG ADDON: CPT

## 2025-03-22 PROCEDURE — 84100 ASSAY OF PHOSPHORUS: CPT

## 2025-03-22 PROCEDURE — 37000009 HC ANESTHESIA EA ADD 15 MINS: Performed by: SURGERY

## 2025-03-22 PROCEDURE — 36000709 HC OR TIME LEV III EA ADD 15 MIN: Performed by: SURGERY

## 2025-03-22 PROCEDURE — 37000008 HC ANESTHESIA 1ST 15 MINUTES: Performed by: SURGERY

## 2025-03-22 PROCEDURE — 83605 ASSAY OF LACTIC ACID: CPT | Performed by: INTERNAL MEDICINE

## 2025-03-22 PROCEDURE — 80048 BASIC METABOLIC PNL TOTAL CA: CPT

## 2025-03-22 PROCEDURE — 36000708 HC OR TIME LEV III 1ST 15 MIN: Performed by: SURGERY

## 2025-03-22 PROCEDURE — 27201107 HC STYLET, STANDARD: Performed by: ANESTHESIOLOGY

## 2025-03-22 PROCEDURE — 63600175 PHARM REV CODE 636 W HCPCS: Mod: TB,JZ | Performed by: ANESTHESIOLOGY

## 2025-03-22 PROCEDURE — 85730 THROMBOPLASTIN TIME PARTIAL: CPT

## 2025-03-22 PROCEDURE — 96367 TX/PROPH/DG ADDL SEQ IV INF: CPT

## 2025-03-22 PROCEDURE — 27201423 OPTIME MED/SURG SUP & DEVICES STERILE SUPPLY: Performed by: SURGERY

## 2025-03-22 PROCEDURE — 27000673 HC TUBING BLOOD Y: Performed by: ANESTHESIOLOGY

## 2025-03-22 PROCEDURE — 85610 PROTHROMBIN TIME: CPT

## 2025-03-22 RX ORDER — LIDOCAINE HYDROCHLORIDE 20 MG/ML
INJECTION, SOLUTION EPIDURAL; INFILTRATION; INTRACAUDAL; PERINEURAL
Status: DISCONTINUED | OUTPATIENT
Start: 2025-03-22 | End: 2025-03-22

## 2025-03-22 RX ORDER — ONDANSETRON HYDROCHLORIDE 2 MG/ML
INJECTION, SOLUTION INTRAVENOUS
Status: DISCONTINUED | OUTPATIENT
Start: 2025-03-22 | End: 2025-03-22

## 2025-03-22 RX ORDER — LORAZEPAM 2 MG/ML
1 INJECTION INTRAMUSCULAR EVERY 4 HOURS PRN
Status: DISCONTINUED | OUTPATIENT
Start: 2025-03-22 | End: 2025-03-23 | Stop reason: HOSPADM

## 2025-03-22 RX ORDER — ESMOLOL HYDROCHLORIDE 10 MG/ML
INJECTION INTRAVENOUS
Status: DISCONTINUED | OUTPATIENT
Start: 2025-03-22 | End: 2025-03-22

## 2025-03-22 RX ORDER — LABETALOL HYDROCHLORIDE 5 MG/ML
INJECTION, SOLUTION INTRAVENOUS
Status: DISCONTINUED | OUTPATIENT
Start: 2025-03-22 | End: 2025-03-22

## 2025-03-22 RX ORDER — ENOXAPARIN SODIUM 100 MG/ML
40 INJECTION SUBCUTANEOUS EVERY 24 HOURS
Status: CANCELLED | OUTPATIENT
Start: 2025-03-22

## 2025-03-22 RX ORDER — FAMOTIDINE 10 MG/ML
INJECTION, SOLUTION INTRAVENOUS
Status: DISCONTINUED | OUTPATIENT
Start: 2025-03-22 | End: 2025-03-22

## 2025-03-22 RX ORDER — HYDRALAZINE HYDROCHLORIDE 20 MG/ML
10 INJECTION INTRAMUSCULAR; INTRAVENOUS EVERY 4 HOURS PRN
Status: DISCONTINUED | OUTPATIENT
Start: 2025-03-22 | End: 2025-03-23 | Stop reason: HOSPADM

## 2025-03-22 RX ORDER — FOLIC ACID 1 MG/1
1 TABLET ORAL DAILY
Status: DISCONTINUED | OUTPATIENT
Start: 2025-03-22 | End: 2025-03-23 | Stop reason: HOSPADM

## 2025-03-22 RX ORDER — OXYCODONE HYDROCHLORIDE 5 MG/1
5 TABLET ORAL
Status: DISCONTINUED | OUTPATIENT
Start: 2025-03-22 | End: 2025-03-22 | Stop reason: HOSPADM

## 2025-03-22 RX ORDER — HYDROMORPHONE HYDROCHLORIDE 1 MG/ML
0.2 INJECTION, SOLUTION INTRAMUSCULAR; INTRAVENOUS; SUBCUTANEOUS EVERY 5 MIN PRN
Status: DISCONTINUED | OUTPATIENT
Start: 2025-03-22 | End: 2025-03-22 | Stop reason: HOSPADM

## 2025-03-22 RX ORDER — PANTOPRAZOLE SODIUM 40 MG/10ML
40 INJECTION, POWDER, LYOPHILIZED, FOR SOLUTION INTRAVENOUS DAILY
Status: DISCONTINUED | OUTPATIENT
Start: 2025-03-22 | End: 2025-03-23

## 2025-03-22 RX ORDER — MIDAZOLAM HYDROCHLORIDE 1 MG/ML
INJECTION INTRAMUSCULAR; INTRAVENOUS
Status: DISCONTINUED | OUTPATIENT
Start: 2025-03-22 | End: 2025-03-22

## 2025-03-22 RX ORDER — THIAMINE HCL 100 MG
100 TABLET ORAL DAILY
Status: DISCONTINUED | OUTPATIENT
Start: 2025-03-22 | End: 2025-03-23 | Stop reason: HOSPADM

## 2025-03-22 RX ORDER — BUPIVACAINE HYDROCHLORIDE AND EPINEPHRINE 2.5; 5 MG/ML; UG/ML
INJECTION, SOLUTION EPIDURAL; INFILTRATION; INTRACAUDAL; PERINEURAL
Status: DISCONTINUED | OUTPATIENT
Start: 2025-03-22 | End: 2025-03-22 | Stop reason: HOSPADM

## 2025-03-22 RX ORDER — HYDROMORPHONE HYDROCHLORIDE 1 MG/ML
1 INJECTION, SOLUTION INTRAMUSCULAR; INTRAVENOUS; SUBCUTANEOUS
Status: DISCONTINUED | OUTPATIENT
Start: 2025-03-22 | End: 2025-03-23

## 2025-03-22 RX ORDER — FENTANYL CITRATE 50 UG/ML
INJECTION, SOLUTION INTRAMUSCULAR; INTRAVENOUS
Status: DISCONTINUED | OUTPATIENT
Start: 2025-03-22 | End: 2025-03-22

## 2025-03-22 RX ORDER — POTASSIUM CHLORIDE 7.45 MG/ML
10 INJECTION INTRAVENOUS
Status: DISPENSED | OUTPATIENT
Start: 2025-03-22 | End: 2025-03-22

## 2025-03-22 RX ORDER — PROPOFOL 10 MG/ML
VIAL (ML) INTRAVENOUS
Status: DISCONTINUED | OUTPATIENT
Start: 2025-03-22 | End: 2025-03-22

## 2025-03-22 RX ORDER — GLUCAGON 1 MG
1 KIT INJECTION
Status: DISCONTINUED | OUTPATIENT
Start: 2025-03-22 | End: 2025-03-22 | Stop reason: HOSPADM

## 2025-03-22 RX ORDER — ACETAMINOPHEN 10 MG/ML
INJECTION, SOLUTION INTRAVENOUS
Status: DISCONTINUED | OUTPATIENT
Start: 2025-03-22 | End: 2025-03-22

## 2025-03-22 RX ORDER — DIPHENHYDRAMINE HYDROCHLORIDE 50 MG/ML
12.5 INJECTION, SOLUTION INTRAMUSCULAR; INTRAVENOUS ONCE AS NEEDED
Status: DISCONTINUED | OUTPATIENT
Start: 2025-03-22 | End: 2025-03-22 | Stop reason: HOSPADM

## 2025-03-22 RX ORDER — ONDANSETRON HYDROCHLORIDE 2 MG/ML
4 INJECTION, SOLUTION INTRAVENOUS DAILY PRN
Status: DISCONTINUED | OUTPATIENT
Start: 2025-03-22 | End: 2025-03-22 | Stop reason: HOSPADM

## 2025-03-22 RX ORDER — ROCURONIUM BROMIDE 10 MG/ML
INJECTION, SOLUTION INTRAVENOUS
Status: DISCONTINUED | OUTPATIENT
Start: 2025-03-22 | End: 2025-03-22

## 2025-03-22 RX ADMIN — HYDROMORPHONE HYDROCHLORIDE 0.2 MG: 1 INJECTION, SOLUTION INTRAMUSCULAR; INTRAVENOUS; SUBCUTANEOUS at 03:03

## 2025-03-22 RX ADMIN — PIPERACILLIN SODIUM AND TAZOBACTAM SODIUM 3.38 G: 3; .375 INJECTION, POWDER, FOR SOLUTION INTRAVENOUS at 10:03

## 2025-03-22 RX ADMIN — PIPERACILLIN SODIUM AND TAZOBACTAM SODIUM 3.38 G: 3; .375 INJECTION, POWDER, FOR SOLUTION INTRAVENOUS at 06:03

## 2025-03-22 RX ADMIN — FENTANYL CITRATE 50 MCG: 50 INJECTION INTRAMUSCULAR; INTRAVENOUS at 02:03

## 2025-03-22 RX ADMIN — POTASSIUM CHLORIDE 10 MEQ: 7.46 INJECTION, SOLUTION INTRAVENOUS at 01:03

## 2025-03-22 RX ADMIN — PIPERACILLIN SODIUM AND TAZOBACTAM SODIUM 3.38 G: 3; .375 INJECTION, POWDER, FOR SOLUTION INTRAVENOUS at 02:03

## 2025-03-22 RX ADMIN — ESMOLOL HYDROCHLORIDE 5 MG: 10 INJECTION, SOLUTION INTRAVENOUS at 01:03

## 2025-03-22 RX ADMIN — POTASSIUM CHLORIDE 10 MEQ: 7.46 INJECTION, SOLUTION INTRAVENOUS at 11:03

## 2025-03-22 RX ADMIN — ONDANSETRON 4 MG: 2 INJECTION INTRAMUSCULAR; INTRAVENOUS at 01:03

## 2025-03-22 RX ADMIN — MIDAZOLAM HYDROCHLORIDE 2 MG: 1 INJECTION, SOLUTION INTRAMUSCULAR; INTRAVENOUS at 01:03

## 2025-03-22 RX ADMIN — FAMOTIDINE 20 MG: 10 INJECTION, SOLUTION INTRAVENOUS at 01:03

## 2025-03-22 RX ADMIN — ROCURONIUM BROMIDE 50 MG: 10 INJECTION, SOLUTION INTRAVENOUS at 01:03

## 2025-03-22 RX ADMIN — SODIUM CHLORIDE, SODIUM LACTATE, POTASSIUM CHLORIDE, AND CALCIUM CHLORIDE: .6; .31; .03; .02 INJECTION, SOLUTION INTRAVENOUS at 01:03

## 2025-03-22 RX ADMIN — FENTANYL CITRATE 100 MCG: 50 INJECTION INTRAMUSCULAR; INTRAVENOUS at 01:03

## 2025-03-22 RX ADMIN — OXYCODONE HYDROCHLORIDE 5 MG: 5 TABLET ORAL at 03:03

## 2025-03-22 RX ADMIN — ACETAMINOPHEN 1000 MG: 10 INJECTION, SOLUTION INTRAVENOUS at 01:03

## 2025-03-22 RX ADMIN — LABETALOL HYDROCHLORIDE 5 MG: 5 INJECTION, SOLUTION INTRAVENOUS at 02:03

## 2025-03-22 RX ADMIN — HYDROCODONE BITARTRATE AND ACETAMINOPHEN 1 TABLET: 5; 325 TABLET ORAL at 05:03

## 2025-03-22 RX ADMIN — PROPOFOL 150 MG: 10 INJECTION, EMULSION INTRAVENOUS at 01:03

## 2025-03-22 RX ADMIN — HYDROMORPHONE HYDROCHLORIDE 1 MG: 1 INJECTION, SOLUTION INTRAMUSCULAR; INTRAVENOUS; SUBCUTANEOUS at 09:03

## 2025-03-22 RX ADMIN — SUGAMMADEX 200 MG: 100 INJECTION, SOLUTION INTRAVENOUS at 02:03

## 2025-03-22 RX ADMIN — LIDOCAINE HYDROCHLORIDE 50 MG: 20 INJECTION, SOLUTION INTRAVENOUS at 01:03

## 2025-03-22 RX ADMIN — PANTOPRAZOLE SODIUM 40 MG: 40 INJECTION, POWDER, FOR SOLUTION INTRAVENOUS at 10:03

## 2025-03-22 RX ADMIN — SODIUM CHLORIDE, POTASSIUM CHLORIDE, SODIUM LACTATE AND CALCIUM CHLORIDE 1000 ML: 600; 310; 30; 20 INJECTION, SOLUTION INTRAVENOUS at 04:03

## 2025-03-22 RX ADMIN — HYDROMORPHONE HYDROCHLORIDE 1 MG: 1 INJECTION, SOLUTION INTRAMUSCULAR; INTRAVENOUS; SUBCUTANEOUS at 06:03

## 2025-03-22 RX ADMIN — HYDROMORPHONE HYDROCHLORIDE 1 MG: 1 INJECTION, SOLUTION INTRAMUSCULAR; INTRAVENOUS; SUBCUTANEOUS at 08:03

## 2025-03-22 RX ADMIN — HYDROMORPHONE HYDROCHLORIDE 1 MG: 1 INJECTION, SOLUTION INTRAMUSCULAR; INTRAVENOUS; SUBCUTANEOUS at 04:03

## 2025-03-22 NOTE — ANESTHESIA PREPROCEDURE EVALUATION
03/22/2025  Rodolfo Rutledge is a 44 y.o., male.           Imaging Results               CT Abdomen Pelvis With IV Contrast NO Oral Contrast (Final result)  Result time 03/21/25 22:39:21      Final result by Velia Crane MD (03/21/25 22:39:21)                   Impression:      1. Findings concerning for acute appendicitis with dilated/enlarged appendix with abnormal wall thickening/enhancement, adjacent inflammatory change and trace adjacent fluid in the right pericolic gutter.  No definite CT evidence to suggest associated perforation or focal fluid collection at this time.  Emergent surgical consultation advised.  2. Multiple enlarged right lower quadrant mesenteric lymph nodes with significant adjacent mesenteric fat stranding.  Findings could be reactive due to aforementioned process although clinical correlation advised.  3. Slight wall thickening of the terminal ileum, possibly reactive.  4. Small hiatal hernia.  Prominent/enlarged paraesophageal/retrocrural lymph node measuring up to 1.0 cm of uncertain etiology/clinical significance.  Clinical correlation and further assessment advised with further assessment with endoscopy as clinically warranted/when clinically appropriate.  5. Additional findings as above.      Electronically signed by: Velia Crane MD  Date:    03/21/2025  Time:    22:39               Narrative:    EXAMINATION:  CT ABDOMEN PELVIS WITH IV CONTRAST    CLINICAL HISTORY:  RLQ abdominal pain (Age >= 14y);    TECHNIQUE:  Low dose axial images, sagittal and coronal reformations were obtained from the lung bases to the pubic symphysis following the IV administration of 100 mL of Omnipaque 350 .  No oral contrast.    COMPARISON:  None.    FINDINGS:  The visualized lung bases demonstrate dependent subsegmental bibasilar atelectasis.  No pleural fluid.  The heart appears slightly  prominent in size.    The liver is normal in size and slightly hypoattenuating relative to the spleen which could relate to contrast bolus timing or hepatic steatosis.  Correlation with LFTs advised.  The main portal vein and splenic vein appear patent.  No calcified stones identified in the gallbladder lumen.  The spleen is unremarkable with an incidentally noted 1.5 cm splenule present.  The adrenal glands and pancreas are within normal limits.  No significant intra or extrahepatic biliary ductal dilatation.    The kidneys are normal in size and location enhance symmetrically.  No hydronephrosis.  The urinary bladder is within normal limits for its given degree of distention.  The prostate is unremarkable.    The abdominal aorta is nonaneurysmal.  Shotty periaortic lymph nodes are noted.    The appendix is enlarged/dilated measuring up to 1.5 cm with abnormal wall thickening and enhancement and abnormal adjacent inflammatory change with trace fluid extending into the right pericolic gutter.  These findings are concerning for acute appendicitis.  No definite evidence of associated focal fluid collection or perforation.  There are multiple enlarged right lower quadrant lymph nodes present measuring up to 1.5 cm with significant adjacent inflammatory change.  Findings could be reactive due to aforementioned findings although clinical correlation recommended.  There is slight wall thickening of the terminal ileum which could be reactive.  The visualized loops of large and small bowel demonstrate no evidence of obstruction or inflammatory change.  Small hiatal hernia noted.  There is an enlarged paraesophageal/retrocrural node noted measuring 1.0 cm (axial series 3, image 36).  No free intraperitoneal air portal venous gas.    Visualized osseous structures are intact with degenerative change.  Very small fat containing umbilical hernia.    This report was flagged in Epic as abnormal.                                        Lab Results   Component Value Date    WBC 18.12 (H) 03/22/2025    HGB 12.1 (L) 03/22/2025    HCT 37.5 (L) 03/22/2025    MCV 85 03/22/2025     03/22/2025     BMP  Lab Results   Component Value Date     03/22/2025    K 3.1 (L) 03/22/2025     03/21/2025    CO2 30 (H) 03/22/2025    BUN 14 03/22/2025    CREATININE 1.5 (H) 03/22/2025    CALCIUM 8.8 03/22/2025    ANIONGAP 7 (L) 03/21/2025     03/21/2025     08/14/2023     06/13/2023       No results found for this or any previous visit.         Pre-op Assessment    I have reviewed the Patient Summary Reports.     I have reviewed the Nursing Notes. I have reviewed the NPO Status.   I have reviewed the Medications.     Review of Systems  Anesthesia Hx:  No problems with previous Anesthesia             Denies Family Hx of Anesthesia complications.    Denies Personal Hx of Anesthesia complications.                    Social:  Alcohol Use, Smoker       Hematology/Oncology:    Oncology Normal    -- Anemia:                                  EENT/Dental:  EENT/Dental Normal           Cardiovascular:     Hypertension, poorly controlled                                          Pulmonary:  Pulmonary Normal                       Renal/:  Chronic Renal Disease, ARF   Hypokalemia     Kidney Function/Disease      Acute Renal Failure (ARF), hypovolemia        Hepatic/GI:     GERD, well controlled         Bowel Conditions:  Appendicitis        Musculoskeletal:  Musculoskeletal Normal                Neurological:  Neurology Normal                                      Endocrine:  Endocrine Normal            Psych:  Psychiatric History                  Physical Exam  General: Well nourished, Cooperative, Alert and Oriented    Airway:  Mallampati: III   Mouth Opening: Normal  TM Distance: > 6 cm  Tongue: Normal  Neck ROM: Normal ROM    Chest/Lungs:  Clear to auscultation, Normal Respiratory Rate    Heart:  Rate: Tachycardia  Rhythm: Regular  Rhythm        Anesthesia Plan  Type of Anesthesia, risks & benefits discussed:    Anesthesia Type: Gen ETT  Intra-op Monitoring Plan: Standard ASA Monitors  Post Op Pain Control Plan: multimodal analgesia and IV/PO Opioids PRN  Induction:  IV and rapid sequence  Airway Plan: Video  Informed Consent: Informed consent signed with the Patient and all parties understand the risks and agree with anesthesia plan.  All questions answered.   ASA Score: 3 Emergent  Anesthesia Plan Notes:       TONI Ortega Decadron   Zofran 4mg iv, Pepcid 20mg iv   Ofirmev 1000mg iv  Sugammadex        Ready For Surgery From Anesthesia Perspective.     .

## 2025-03-22 NOTE — ANESTHESIA PROCEDURE NOTES
Intubation    Date/Time: 3/22/2025 1:47 PM    Performed by: Gino Sandhu CRNA  Authorized by: Dany Sullivan MD    Intubation:     Induction:  Rapid sequence induction    Intubated:  Postinduction    Mask Ventilation:  N/a    Attempts:  1    Attempted By:  CRNA    Method of Intubation:  Video laryngoscopy    Blade:  Barrios 3    Laryngeal View Grade: Grade I - full view of cords      Difficult Airway Encountered?: No      Complications:  None    Airway Device:  Oral endotracheal tube    Airway Device Size:  7.5    Style/Cuff Inflation:  Cuffed    Inflation Amount (mL):  7    Tube secured:  22    Secured at:  The lips    Placement Verified By:  Capnometry    Complicating Factors:  None    Findings Post-Intubation:  BS equal bilateral and atraumatic/condition of teeth unchanged

## 2025-03-22 NOTE — HOSPITAL COURSE
Rodolfo Rutledge is a 44 year old male with a past medical history of EtOH and tobacco dependence, HTN, GERD, and anemia who presented with acute appendicitis. He was on IV fluids and Zosyn. PRN analgesics and antiemetics were ordered. Surgery performed laparoscopic appendectomy 3/22 which he tolerated without complication. His course was complicated by ANG and elevated BP. He was able to tolerate a PO diet and was discharged 3/23. He will follow up with Surgery.

## 2025-03-22 NOTE — ASSESSMENT & PLAN NOTE
- Patient presented to the ED with right lower quadrant abdominal pain and found to have findings concerning for appendicitis on CTAP  - General surgery consulted, appreciate recs  - Thus far has recommended IV antibiotics, maintenance fluids, NPO at midnight  - PRN analgesia  - Symptomatic care

## 2025-03-22 NOTE — ED PROVIDER NOTES
Encounter Date: 3/21/2025       History     Chief Complaint   Patient presents with    Abdominal Pain     RLQ abdominal pain x 2 days. Denies vomiting or diarrhea.      HPI  Rlq pain for 2 days, worsening. No fevers, chills, n/v/d. No abdominal surgeries. Hx htn   Review of patient's allergies indicates:  No Known Allergies  Past Medical History:   Diagnosis Date    GERD (gastroesophageal reflux disease) 3/22/2025    Hypertension      Past Surgical History:   Procedure Laterality Date    ANKLE SURGERY      LAPAROSCOPIC APPENDECTOMY N/A 3/22/2025    Procedure: APPENDECTOMY, LAPAROSCOPIC;  Surgeon: Jerrod Grayson MD;  Location: [facility] OR;  Service: General;  Laterality: N/A;    TYMPANOSTOMY TUBE PLACEMENT       Family History   Problem Relation Name Age of Onset    Asthma Mother      Diabetes Father       Social History[1]  Review of Systems  See hpi   Physical Exam     Initial Vitals [03/21/25 1837]   BP Pulse Resp Temp SpO2   (!) 195/121 (!) 131 18 99 °F (37.2 °C) 97 %      MAP       --         Physical Exam    Nursing note and vitals reviewed.  Constitutional: He appears well-developed and well-nourished. He is not diaphoretic.   Answering questions in full sentences without increased work of breathing.    HENT:   Head: Normocephalic.   Eyes: Right eye exhibits no discharge. Left eye exhibits no discharge. No scleral icterus.   Neck: Neck supple. No tracheal deviation present.   Normal range of motion.  Cardiovascular:  Normal rate and regular rhythm.           Pulmonary/Chest: No stridor. No respiratory distress. He has no wheezes. He has no rhonchi. He has no rales. He exhibits no tenderness.   Abdominal: Abdomen is soft. There is abdominal tenderness (RLQ). There is no rebound and no guarding.   Musculoskeletal:         General: No edema.      Cervical back: Normal range of motion and neck supple.     Neurological: He is alert and oriented to person, place, and time.   Moving all extremities   Skin: Skin is  warm and dry.         ED Course   Procedures  Labs Reviewed   CBC W/ AUTO DIFFERENTIAL - Abnormal       Result Value    WBC 19.88 (*)     RBC 4.96      Hemoglobin 13.4 (*)     Hematocrit 41.6      MCV 84      MCH 27.0      MCHC 32.2      RDW 16.0 (*)     Platelets 264      MPV 11.1      Immature Granulocytes 0.6 (*)     Gran # (ANC) 17.4 (*)     Immature Grans (Abs) 0.11 (*)     Lymph # 1.2      Mono # 1.1 (*)     Eos # 0.0      Baso # 0.03      nRBC 0      Gran % 87.2 (*)     Lymph % 6.2 (*)     Mono % 5.7      Eosinophil % 0.1      Basophil % 0.2      Differential Method Automated     COMPREHENSIVE METABOLIC PANEL - Abnormal    Sodium 139      Potassium 3.4 (*)     Chloride 103      CO2 29      Glucose 106      BUN 14      Creatinine 1.7 (*)     Calcium 9.7      Total Protein 7.8      Albumin 4.1      Total Bilirubin 0.9      Alkaline Phosphatase 72      AST 14      ALT 11      eGFR 50.7 (*)     Anion Gap 7 (*)    URINALYSIS, REFLEX TO URINE CULTURE - Abnormal    Color, UA Yellow      Appearance, UA Clear      Spec Grav UA >=1.030 (*)     pH, UA 7.0      Protein, UA Trace (*)     Glucose, UA Negative      Ketones, UA Negative      Blood, UA Negative      Bilirubin, UA Negative      Urobilinogen, UA Negative      Nitrites, UA Negative      Leukocyte Esterase, UA Negative     BASIC METABOLIC PANEL - Abnormal    Sodium 136      Potassium 3.1 (*)     Chloride 102      CO2 30 (*)     Glucose 104      BUN 14      Creatinine 1.5 (*)     Calcium 8.8      Anion Gap 4 (*)     eGFR 59 (*)    CBC WITH DIFFERENTIAL - Abnormal    WBC 18.12 (*)     RBC 4.43 (*)     Hgb 12.1 (*)     Hct 37.5 (*)     MCV 85      MCH 27.3      MCHC 32.3      RDW 16.3 (*)     Platelet Count 196      MPV 11.9      Nucleated RBC 0      Neut % 88.0 (*)     Lymph % 6.8 (*)     Mono % 4.2      Eos % 0.2      Basophil % 0.2      Imm Grans % 0.6 (*)     Neut # 16.0 (*)     Lymph # 1.23      Mono # 0.76      Eos # 0.04      Baso # 0.03      Imm Grans # 0.11  (*)    ISTAT CREATININE - Abnormal    POC Creatinine 1.7 (*)     Sample VENOUS     LACTIC ACID, PLASMA - Normal    Lactic Acid Level 0.6      Narrative:     Falsely low lactic acid results can be found in samples containing >=13.0 mg/dL total bilirubin and/or >=3.5 mg/dL direct bilirubin.    MAGNESIUM - Normal    Magnesium 1.9     PHOSPHORUS - Normal    Phosphorus Level 2.7     PROTIME-INR - Normal    PT 11.3      INR 1.0     APTT - Normal    PTT 23.5     LIPASE    Lipase 16     CBC W/ AUTO DIFFERENTIAL    Narrative:     The following orders were created for panel order CBC with Automated Differential.  Procedure                               Abnormality         Status                     ---------                               -----------         ------                     CBC with Differential[[id number]]       Abnormal            Final result                 Please view results for these tests on the individual orders.   URINALYSIS, REFLEX TO URINE CULTURE   POCT GLUCOSE    POCT Glucose 80            Imaging Results               CT Abdomen Pelvis With IV Contrast NO Oral Contrast (Final result)  Result time 03/21/25 22:39:21      Final result by Velia Crane MD (03/21/25 22:39:21)                   Impression:      1. Findings concerning for acute appendicitis with dilated/enlarged appendix with abnormal wall thickening/enhancement, adjacent inflammatory change and trace adjacent fluid in the right pericolic gutter.  No definite CT evidence to suggest associated perforation or focal fluid collection at this time.  Emergent surgical consultation advised.  2. Multiple enlarged right lower quadrant mesenteric lymph nodes with significant adjacent mesenteric fat stranding.  Findings could be reactive due to aforementioned process although clinical correlation advised.  3. Slight wall thickening of the terminal ileum, possibly reactive.  4. Small hiatal hernia.  Prominent/enlarged paraesophageal/retrocrural lymph  node measuring up to 1.0 cm of uncertain etiology/clinical significance.  Clinical correlation and further assessment advised with further assessment with endoscopy as clinically warranted/when clinically appropriate.  5. Additional findings as above.      Electronically signed by: Velia Crane MD  Date:    03/21/2025  Time:    22:39               Narrative:    EXAMINATION:  CT ABDOMEN PELVIS WITH IV CONTRAST    CLINICAL HISTORY:  RLQ abdominal pain (Age >= 14y);    TECHNIQUE:  Low dose axial images, sagittal and coronal reformations were obtained from the lung bases to the pubic symphysis following the IV administration of 100 mL of Omnipaque 350 .  No oral contrast.    COMPARISON:  None.    FINDINGS:  The visualized lung bases demonstrate dependent subsegmental bibasilar atelectasis.  No pleural fluid.  The heart appears slightly prominent in size.    The liver is normal in size and slightly hypoattenuating relative to the spleen which could relate to contrast bolus timing or hepatic steatosis.  Correlation with LFTs advised.  The main portal vein and splenic vein appear patent.  No calcified stones identified in the gallbladder lumen.  The spleen is unremarkable with an incidentally noted 1.5 cm splenule present.  The adrenal glands and pancreas are within normal limits.  No significant intra or extrahepatic biliary ductal dilatation.    The kidneys are normal in size and location enhance symmetrically.  No hydronephrosis.  The urinary bladder is within normal limits for its given degree of distention.  The prostate is unremarkable.    The abdominal aorta is nonaneurysmal.  Shotty periaortic lymph nodes are noted.    The appendix is enlarged/dilated measuring up to 1.5 cm with abnormal wall thickening and enhancement and abnormal adjacent inflammatory change with trace fluid extending into the right pericolic gutter.  These findings are concerning for acute appendicitis.  No definite evidence of associated focal  fluid collection or perforation.  There are multiple enlarged right lower quadrant lymph nodes present measuring up to 1.5 cm with significant adjacent inflammatory change.  Findings could be reactive due to aforementioned findings although clinical correlation recommended.  There is slight wall thickening of the terminal ileum which could be reactive.  The visualized loops of large and small bowel demonstrate no evidence of obstruction or inflammatory change.  Small hiatal hernia noted.  There is an enlarged paraesophageal/retrocrural node noted measuring 1.0 cm (axial series 3, image 36).  No free intraperitoneal air portal venous gas.    Visualized osseous structures are intact with degenerative change.  Very small fat containing umbilical hernia.    This report was flagged in Epic as abnormal.                                       Medications   lactated ringers infusion (0 mLs Intravenous Stopped 3/23/25 0453)   potassium chloride 10 mEq in 100 mL IVPB (10 mEq Intravenous New Bag 3/22/25 1301)   HYDROmorphone injection 0.5 mg (0.5 mg Intravenous Given 3/21/25 2130)   lactated ringers bolus 500 mL (0 mLs Intravenous Stopped 3/22/25 0036)   piperacillin-tazobactam (ZOSYN) 4.5 g in D5W 100 mL IVPB (MB+) (0 g Intravenous Stopped 3/21/25 2309)   iohexoL (OMNIPAQUE 350) injection 100 mL (100 mLs Intravenous Given 3/21/25 2224)   HYDROmorphone injection 0.5 mg (0.5 mg Intravenous Given 3/21/25 2318)   ondansetron injection 4 mg (4 mg Intravenous Given 3/21/25 2318)   potassium bicarbonate disintegrating tablet 20 mEq (20 mEq Oral Given 3/21/25 2315)   magnesium oxide tablet 400 mg (400 mg Oral Given 3/21/25 2315)     Medical Decision Making  Risk  OTC drugs.  Prescription drug management.          On my independent interpretation labs cbc, cmp, lactic within acceptable limits except wbc 19     Per radiology ct abd pelvis showing appendicitis. Met sirs criteria with wbc 19 and hr 130's on presentation.  Started on  john. Discussed with Dr. Grayson who has agreed with hospital admission and reports that he will see pt in the morning. Admitted to hospital medicine after discussion with hospitalist np jose Butterfield MD  Emergency Medicine Staff Physician                                      Clinical Impression:  Final diagnoses:  [K37] Appendicitis (Primary)          ED Disposition Condition    Observation                     [1]   Social History  Tobacco Use    Smoking status: Some Days     Types: Cigarettes   Substance Use Topics    Alcohol use: Yes     Comment: sometimes    Drug use: No        Opal Butterfield MD  03/26/25 0515

## 2025-03-22 NOTE — SUBJECTIVE & OBJECTIVE
Past Medical History:   Diagnosis Date    Hypertension        Past Surgical History:   Procedure Laterality Date    ANKLE SURGERY      TYMPANOSTOMY TUBE PLACEMENT         Review of patient's allergies indicates:  No Known Allergies    No current facility-administered medications on file prior to encounter.     Current Outpatient Medications on File Prior to Encounter   Medication Sig    losartan (COZAAR) 25 MG tablet Take 25 mg by mouth once daily.    acetaminophen (TYLENOL) 500 MG tablet Take 1 tablet (500 mg total) by mouth every 6 (six) hours as needed for Temperature greater than or Pain.    amLODIPine (NORVASC) 5 MG tablet Take 2 tablets (10 mg total) by mouth once daily.    hydroCHLOROthiazide (HYDRODIURIL) 12.5 MG Tab Take 1 tablet (12.5 mg total) by mouth once daily.    HYDROcodone-acetaminophen (NORCO) 5-325 mg per tablet Take 1 tablet by mouth every 6 (six) hours as needed for Pain.    ibuprofen (ADVIL,MOTRIN) 800 MG tablet Take 1 tablet (800 mg total) by mouth every 8 (eight) hours as needed for Pain.    LIDOcaine (LIDODERM) 5 % Place 1 patch onto the skin once daily. Remove & Discard patch within 12 hours then leave off for 12 hours    methocarbamoL (ROBAXIN) 500 MG Tab Take 2 tablets (1,000 mg total) by mouth 3 (three) times daily as needed (Muscle pain/spasms).    mupirocin (BACTROBAN) 2 % ointment Apply topically 3 (three) times daily.    pantoprazole (PROTONIX) 20 MG tablet Take 1 tablet (20 mg total) by mouth once daily. for 14 days    [DISCONTINUED] ibuprofen (ADVIL,MOTRIN) 600 MG tablet Take 1 tablet (600 mg total) by mouth every 6 (six) hours as needed for Pain.     Family History       Problem Relation (Age of Onset)    Asthma Mother    Diabetes Father          Tobacco Use    Smoking status: Some Days     Types: Cigarettes    Smokeless tobacco: Not on file   Substance and Sexual Activity    Alcohol use: Yes     Comment: sometimes    Drug use: No    Sexual activity: Yes     Partners: Female      Birth control/protection: None     Review of Systems   Constitutional:  Negative for chills and fever.   Respiratory:  Negative for shortness of breath.    Cardiovascular:  Negative for chest pain.   Gastrointestinal:  Positive for abdominal pain and nausea. Negative for diarrhea and vomiting.   Genitourinary:  Negative for dysuria and hematuria.   Neurological:  Negative for dizziness, syncope and light-headedness.     Objective:     Vital Signs (Most Recent):  Temp: 99 °F (37.2 °C) (03/21/25 1837)  Pulse: (!) 131 (03/21/25 1837)  Resp: 17 (03/21/25 2318)  BP: (!) 195/121 (03/21/25 1837)  SpO2: 97 % (03/21/25 1837) Vital Signs (24h Range):  Temp:  [99 °F (37.2 °C)] 99 °F (37.2 °C)  Pulse:  [131] 131  Resp:  [17-18] 17  SpO2:  [97 %] 97 %  BP: (195)/(121) 195/121     Weight: 83.9 kg (185 lb)  Body mass index is 27.32 kg/m².     Physical Exam  Vitals reviewed.   Constitutional:       General: He is awake. He is not in acute distress.     Appearance: Normal appearance. He is not ill-appearing or diaphoretic.   Cardiovascular:      Rate and Rhythm: Normal rate.      Heart sounds: Normal heart sounds. No murmur heard.     No friction rub. No gallop.      Comments: Trace BLE edema  Pulmonary:      Effort: Pulmonary effort is normal. No accessory muscle usage or respiratory distress.      Breath sounds: Normal breath sounds. No wheezing, rhonchi or rales.   Abdominal:      General: Bowel sounds are increased.      Palpations: Abdomen is soft.      Tenderness: There is abdominal tenderness in the right lower quadrant and suprapubic area. There is no guarding or rebound. Positive signs include McBurney's sign.   Skin:     General: Skin is warm and dry.   Neurological:      Mental Status: He is alert and oriented to person, place, and time.   Psychiatric:         Behavior: Behavior is cooperative.                Significant Labs: All pertinent labs within the past 24 hours have been reviewed.  CBC:   Recent Labs   Lab  03/21/25 2108   WBC 19.88*   HGB 13.4*   HCT 41.6        CMP:   Recent Labs   Lab 03/21/25 2108      K 3.4*      CO2 29      BUN 14   CREATININE 1.7*   CALCIUM 9.7   PROT 7.8   ALBUMIN 4.1   BILITOT 0.9   ALKPHOS 72   AST 14   ALT 11   ANIONGAP 7*     Lipase:   Recent Labs   Lab 03/21/25  2108   LIPASE 16       Significant Imaging:   Imaging Results               CT Abdomen Pelvis With IV Contrast NO Oral Contrast (Final result)  Result time 03/21/25 22:39:21      Final result by Velia Crane MD (03/21/25 22:39:21)                   Impression:      1. Findings concerning for acute appendicitis with dilated/enlarged appendix with abnormal wall thickening/enhancement, adjacent inflammatory change and trace adjacent fluid in the right pericolic gutter.  No definite CT evidence to suggest associated perforation or focal fluid collection at this time.  Emergent surgical consultation advised.  2. Multiple enlarged right lower quadrant mesenteric lymph nodes with significant adjacent mesenteric fat stranding.  Findings could be reactive due to aforementioned process although clinical correlation advised.  3. Slight wall thickening of the terminal ileum, possibly reactive.  4. Small hiatal hernia.  Prominent/enlarged paraesophageal/retrocrural lymph node measuring up to 1.0 cm of uncertain etiology/clinical significance.  Clinical correlation and further assessment advised with further assessment with endoscopy as clinically warranted/when clinically appropriate.  5. Additional findings as above.      Electronically signed by: Velia Crane MD  Date:    03/21/2025  Time:    22:39               Narrative:    EXAMINATION:  CT ABDOMEN PELVIS WITH IV CONTRAST    CLINICAL HISTORY:  RLQ abdominal pain (Age >= 14y);    TECHNIQUE:  Low dose axial images, sagittal and coronal reformations were obtained from the lung bases to the pubic symphysis following the IV administration of 100 mL of Omnipaque  350 .  No oral contrast.    COMPARISON:  None.    FINDINGS:  The visualized lung bases demonstrate dependent subsegmental bibasilar atelectasis.  No pleural fluid.  The heart appears slightly prominent in size.    The liver is normal in size and slightly hypoattenuating relative to the spleen which could relate to contrast bolus timing or hepatic steatosis.  Correlation with LFTs advised.  The main portal vein and splenic vein appear patent.  No calcified stones identified in the gallbladder lumen.  The spleen is unremarkable with an incidentally noted 1.5 cm splenule present.  The adrenal glands and pancreas are within normal limits.  No significant intra or extrahepatic biliary ductal dilatation.    The kidneys are normal in size and location enhance symmetrically.  No hydronephrosis.  The urinary bladder is within normal limits for its given degree of distention.  The prostate is unremarkable.    The abdominal aorta is nonaneurysmal.  Shotty periaortic lymph nodes are noted.    The appendix is enlarged/dilated measuring up to 1.5 cm with abnormal wall thickening and enhancement and abnormal adjacent inflammatory change with trace fluid extending into the right pericolic gutter.  These findings are concerning for acute appendicitis.  No definite evidence of associated focal fluid collection or perforation.  There are multiple enlarged right lower quadrant lymph nodes present measuring up to 1.5 cm with significant adjacent inflammatory change.  Findings could be reactive due to aforementioned findings although clinical correlation recommended.  There is slight wall thickening of the terminal ileum which could be reactive.  The visualized loops of large and small bowel demonstrate no evidence of obstruction or inflammatory change.  Small hiatal hernia noted.  There is an enlarged paraesophageal/retrocrural node noted measuring 1.0 cm (axial series 3, image 36).  No free intraperitoneal air portal venous  gas.    Visualized osseous structures are intact with degenerative change.  Very small fat containing umbilical hernia.    This report was flagged in Epic as abnormal.

## 2025-03-22 NOTE — TRANSFER OF CARE
"Anesthesia Transfer of Care Note    Patient: Rodolfo Rutledge    Procedure(s) Performed: Procedure(s) (LRB):  APPENDECTOMY, LAPAROSCOPIC (N/A)    Patient location: PACU    Anesthesia Type: general    Transport from OR: Transported from OR on room air with adequate spontaneous ventilation    Post pain: adequate analgesia    Post assessment: no apparent anesthetic complications and tolerated procedure well    Post vital signs: stable    Level of consciousness: awake, alert and oriented    Nausea/Vomiting: no nausea/vomiting    Complications: none    Transfer of care protocol was followed      Last vitals: Visit Vitals  BP (!) 156/75   Pulse 96   Temp 37.1 °C (98.8 °F) (Oral)   Resp 18   Ht 5' 9" (1.753 m)   Wt 83.9 kg (185 lb)   SpO2 96%   BMI 27.32 kg/m²     "

## 2025-03-22 NOTE — OP NOTE
Date of procedure: March 22, 2025     Staff surgeon: Dr. Jerrod Grayson     Preoperative diagnosis:  Acute appendicitis     Postoperative diagnosis: The same     Procedure: Laparoscopic appendectomy     Anesthesia: General endotracheal anesthesia     Indication for procedure:   44-year-old gentleman presented to the hospital with findings consistent with acute appendicitis     Description procedure:   Following signing informed consent was taken to the operating room placed supine position.  General endotracheal anesthesia was administered.  Miles catheter was inserted.  Abdomen is prepped and draped standard fashion.  Time-out procedure was performed.  I make a small vertical incision just below the umbilicus.  Dissect down to the fascia.  The abdominal cavity was entered with a Veress needle.  Pneumoperitoneum to 15 mm of mercury was established.  Next a 12 mm trocar was placed under direct visualization.  I evaluate for injury from the Veress needle.  No such injuries noted.  Two 5 mm trocars were then placed in the lower midline under direct visualization.  The patient is placed in Trendelenburg with left-sided tilt.  Coming off the cecum in the right lower quadrant was obviously inflamed dilated appendix.  It is significantly inflamed there was no evidence of perforation or abscess.  I take down adhesions from the lateral abdominal wall.  Take down adhesions of the small bowel to the appendix.  The appendix was then grasped and held upright.  At the base of the appendix I separate the mesoappendix from the appendix.  A staple across the appendix with 1 load of the Endo-MICHELLE without event.  A staple across the mesoappendix with multiple firings.  I remove the appendix from the abdominal cavity with the assistance of an Endo-Catch bag.  I evaluate all staple lines ensure adequate hemostasis.  Having removed the Endo-Catch bag I closed the umbilical fascia with 0 Vicryl suture.  Skin incisions closed with 4-0  Monocryl.  Patient was extubated taken recovery room in stable condition.  There were no immediate complications.  Blood loss was 25 cc

## 2025-03-22 NOTE — PROGRESS NOTES
[facility] - Emergency Dept  Hospital Medicine  Progress Note    Patient Name: Rodolfo Rutledge  MRN: [id number]  Patient Class: OP- Observation   Admission Date: 3/21/2025  Length of Stay: 0 days  Attending Physician: Noe Mccray MD  Primary Care Provider: Zaid Bridges MD        Subjective     Principal Problem:Appendicitis        HPI:  Mr. Rutledge is a 44 yr old male with a hx of hypertension who presented to the ED with a chief complaint of abdominal pain.  Patient endorses 10+/10 constant right lower quadrant abdominal pain described as achy and sore that began yesterday.  He states the pain radiated all the way across his lower abdomen to his left lower quadrant.  He also endorses some nausea earlier on this week.  He states he took a pain medication that his mom gave him at home with minimal relief.  He endorses that the pain medication in the ED has masked the pain.  He smokes cigars in the weekends, drinks alcohol on the weekends, and denies recreational drug use.  He denies fever, chills, dyspnea, chest pain, vomiting, diarrhea, dysuria, hematuria, lightheadedness, dizziness, and syncope.    Upon arrival to ED, patient afebrile, HR of 131, RR of 18, BP of 195/121, satting 97% on RA.  Workup in the ED revealed WBC of 19.88, hemoglobin of 13.4, potassium of 3.4, creatinine 1.7, GFR of 50.7.  Remainder of labs fairly unremarkable.  CTAP shows findings concerning for acute appendicitis with dilated/enlarged appendix with abnormal wall thickening/enhancement, adjacent inflammatory change and trace adjacent fluid in the right pericolic gutter.  No definite CT evidence to suggest associated perforation or focal fluid collection at this time.  Multiple enlarged right lower quadrant mesenteric lymph nodes with significant adjacent mesenteric fat stranding.  Findings could be reactive due to the aforementioned process although clinical correlation advised.  Slight wall thickening of the  "terminal ileum, possibly reactive.  Small hiatal hernia.  Prominent/enlarged paraesophageal/retrocrural lymph node measuring up to 1 cm uncertain etiology/clinical significance.  Patient was given hydromorphone 0.5 mg IV x2,  mL bolus, Mag oxide 400 mg oral, ondansetron 4 mg IV, Zosyn 4.5 g IV, potassium bicarb 20 mEq oral while in the ED. ED provider discussed case with General surgery Dr. Grayson who recommended IV antibiotics, maintenance fluids, NPO at midnight.    Overview/Hospital Course:  Rodolfo Rutledge is a 44 year old male with a past medical history of EtOH and tobacco dependence, HTN, GERD, and anemia who presented with acute appendicitis. He is NPO and Surgery has been consulted. He is on IV fluids and Zosyn. PRN analgesics and antiemetics have been ordered.    Interval History: see "Hospital Course"    Review of Systems   Gastrointestinal:  Positive for abdominal pain and nausea. Negative for vomiting.     Objective:     Vital Signs (Most Recent):  Temp: 99 °F (37.2 °C) (03/21/25 1837)  Pulse: 91 (03/22/25 0811)  Resp: 18 (03/22/25 0834)  BP: (!) 182/93 (03/22/25 0811)  SpO2: 96 % (03/22/25 0810) Vital Signs (24h Range):  Temp:  [99 °F (37.2 °C)] 99 °F (37.2 °C)  Pulse:  [] 91  Resp:  [17-18] 18  SpO2:  [92 %-97 %] 96 %  BP: (127-195)/() 182/93     Weight: 83.9 kg (185 lb)  Body mass index is 27.32 kg/m².    Intake/Output Summary (Last 24 hours) at 3/22/2025 0905  Last data filed at 3/22/2025 0430  Gross per 24 hour   Intake 0 ml   Output 400 ml   Net -400 ml         Physical Exam  Vitals and nursing note reviewed.   Constitutional:       General: He is not in acute distress.  HENT:      Head: Normocephalic and atraumatic.      Right Ear: External ear normal.      Left Ear: External ear normal.      Nose: Nose normal.      Mouth/Throat:      Mouth: Mucous membranes are moist.      Pharynx: No oropharyngeal exudate.   Eyes:      Extraocular Movements: Extraocular movements intact.      " Conjunctiva/sclera: Conjunctivae normal.   Cardiovascular:      Rate and Rhythm: Normal rate and regular rhythm.      Pulses: Normal pulses.      Heart sounds: Normal heart sounds.   Pulmonary:      Effort: Pulmonary effort is normal.      Breath sounds: Normal breath sounds.      Comments: RA.  Abdominal:      General: There is no distension.      Tenderness: There is abdominal tenderness.   Musculoskeletal:         General: Normal range of motion.      Cervical back: Normal range of motion and neck supple.      Right lower leg: No edema.      Left lower leg: No edema.   Skin:     General: Skin is warm and dry.   Neurological:      Mental Status: He is alert. Mental status is at baseline.   Psychiatric:         Mood and Affect: Mood normal.         Behavior: Behavior normal.               Significant Labs: All pertinent labs within the past 24 hours have been reviewed.    Significant Imaging: I have reviewed all pertinent imaging results/findings within the past 24 hours.      Assessment & Plan  Appendicitis  -NPO  -Zosyn  -IV fluids  -Surgery consulted  -PRN antiemetics and analgesics    ANG (acute kidney injury)  In setting of acute appendicitis.  -IV fluids  -Avoid nephrotoxic agents  -Renally dose medications  -Monitor UOP and electrolytes  -Trend creatinine  Hypokalemia  -Replete IV   -PRN PO repletion  -Trend K  Hypertension  -Amlodipine 10  -PRN IV hydralazine  -Continue to monitor    EtOH dependence  -MVI, thiamine and folic acid  -Monitor for signs and symptoms of withdrawal  - patient on cessation    Tobacco dependence  -Patient to be counseled on cessation  GERD (gastroesophageal reflux disease)  -PPI    Anemia  Stable. Chronic.  -Trend Hgb with CBC  VTE Risk Mitigation (From admission, onward)           Ordered     IP VTE LOW RISK PATIENT  Once         03/21/25 2334     Place sequential compression device  Until discontinued         03/21/25 2334                    Discharge Planning   LACY:       Code Status: Full Code   Medical Readiness for Discharge Date:                            Noe Mccray MD  Department of Hospital Medicine   [facility] - Emergency Dept

## 2025-03-22 NOTE — BRIEF OP NOTE
[facility]  Brief Operative Note    SUMMARY     Surgery Date: 3/22/2025     Surgeons and Role:     * Jerrod Grayson MD - Primary    Assisting Surgeon: None    Pre-op Diagnosis:  Appendicitis [K37]    Post-op Diagnosis:  Post-Op Diagnosis Codes:     * Appendicitis [K37]    Procedure(s) (LRB):  APPENDECTOMY, LAPAROSCOPIC (N/A)    Anesthesia: General    Implants:  * No implants in log *    Operative Findings: acute appendicitis with dilation    Estimated Blood Loss: 25 cc's  Estimated Blood Loss has been documented.         Specimens:   Specimen (24h ago, onward)       Start     Ordered    03/22/25 1413  Specimen to Pathology - Surgery  Once        Comments: Pre-op Diagnosis: Appendicitis [K37]Procedure(s):APPENDECTOMY, LAPAROSCOPIC Number of specimens: 1Name of specimens: appendix     Question:  Release to patient  Answer:  Immediate    03/22/25 1413                  * No specimens in log *    [id number]

## 2025-03-22 NOTE — H&P
[facility] - Emergency Dept  Hospital Medicine  History & Physical    Patient Name: Rodolfo Rutledge  MRN: [id number]  Patient Class: OP- Observation  Admission Date: 3/21/2025  Attending Physician: Sharla Villanueva MD   Primary Care Provider: Zaid Bridges MD         Patient information was obtained from patient, past medical records, and ER records.     Subjective:     Principal Problem:Appendicitis    Chief Complaint:   Chief Complaint   Patient presents with    Abdominal Pain     RLQ abdominal pain x 2 days. Denies vomiting or diarrhea.         HPI: Mr. Rutledge is a 44 yr old male with a hx of hypertension who presented to the ED with a chief complaint of abdominal pain.  Patient endorses 10+/10 constant right lower quadrant abdominal pain described as achy and sore that began yesterday.  He states the pain radiated all the way across his lower abdomen to his left lower quadrant.  He also endorses some nausea earlier on this week.  He states he took a pain medication that his mom gave him at home with minimal relief.  He endorses that the pain medication in the ED has masked the pain.  He smokes cigars in the weekends, drinks alcohol on the weekends, and denies recreational drug use.  He denies fever, chills, dyspnea, chest pain, vomiting, diarrhea, dysuria, hematuria, lightheadedness, dizziness, and syncope.    Upon arrival to ED, patient afebrile, HR of 131, RR of 18, BP of 195/121, satting 97% on RA.  Workup in the ED revealed WBC of 19.88, hemoglobin of 13.4, potassium of 3.4, creatinine 1.7, GFR of 50.7.  Remainder of labs fairly unremarkable.  CTAP shows findings concerning for acute appendicitis with dilated/enlarged appendix with abnormal wall thickening/enhancement, adjacent inflammatory change and trace adjacent fluid in the right pericolic gutter.  No definite CT evidence to suggest associated perforation or focal fluid collection at this time.  Multiple enlarged right lower quadrant  mesenteric lymph nodes with significant adjacent mesenteric fat stranding.  Findings could be reactive due to the aforementioned process although clinical correlation advised.  Slight wall thickening of the terminal ileum, possibly reactive.  Small hiatal hernia.  Prominent/enlarged paraesophageal/retrocrural lymph node measuring up to 1 cm uncertain etiology/clinical significance.  Patient was given hydromorphone 0.5 mg IV x2,  mL bolus, Mag oxide 400 mg oral, ondansetron 4 mg IV, Zosyn 4.5 g IV, potassium bicarb 20 mEq oral while in the ED. ED provider discussed case with General surgery Dr. Grayson who recommended IV antibiotics, maintenance fluids, NPO at midnight.    Past Medical History:   Diagnosis Date    Hypertension        Past Surgical History:   Procedure Laterality Date    ANKLE SURGERY      TYMPANOSTOMY TUBE PLACEMENT         Review of patient's allergies indicates:  No Known Allergies    No current facility-administered medications on file prior to encounter.     Current Outpatient Medications on File Prior to Encounter   Medication Sig    losartan (COZAAR) 25 MG tablet Take 25 mg by mouth once daily.    acetaminophen (TYLENOL) 500 MG tablet Take 1 tablet (500 mg total) by mouth every 6 (six) hours as needed for Temperature greater than or Pain.    amLODIPine (NORVASC) 5 MG tablet Take 2 tablets (10 mg total) by mouth once daily.    hydroCHLOROthiazide (HYDRODIURIL) 12.5 MG Tab Take 1 tablet (12.5 mg total) by mouth once daily.    HYDROcodone-acetaminophen (NORCO) 5-325 mg per tablet Take 1 tablet by mouth every 6 (six) hours as needed for Pain.    ibuprofen (ADVIL,MOTRIN) 800 MG tablet Take 1 tablet (800 mg total) by mouth every 8 (eight) hours as needed for Pain.    LIDOcaine (LIDODERM) 5 % Place 1 patch onto the skin once daily. Remove & Discard patch within 12 hours then leave off for 12 hours    methocarbamoL (ROBAXIN) 500 MG Tab Take 2 tablets (1,000 mg total) by mouth 3 (three) times daily  as needed (Muscle pain/spasms).    mupirocin (BACTROBAN) 2 % ointment Apply topically 3 (three) times daily.    pantoprazole (PROTONIX) 20 MG tablet Take 1 tablet (20 mg total) by mouth once daily. for 14 days    [DISCONTINUED] ibuprofen (ADVIL,MOTRIN) 600 MG tablet Take 1 tablet (600 mg total) by mouth every 6 (six) hours as needed for Pain.     Family History       Problem Relation (Age of Onset)    Asthma Mother    Diabetes Father          Tobacco Use    Smoking status: Some Days     Types: Cigarettes    Smokeless tobacco: Not on file   Substance and Sexual Activity    Alcohol use: Yes     Comment: sometimes    Drug use: No    Sexual activity: Yes     Partners: Female     Birth control/protection: None     Review of Systems   Constitutional:  Negative for chills and fever.   Respiratory:  Negative for shortness of breath.    Cardiovascular:  Negative for chest pain.   Gastrointestinal:  Positive for abdominal pain and nausea. Negative for diarrhea and vomiting.   Genitourinary:  Negative for dysuria and hematuria.   Neurological:  Negative for dizziness, syncope and light-headedness.     Objective:     Vital Signs (Most Recent):  Temp: 99 °F (37.2 °C) (03/21/25 1837)  Pulse: (!) 131 (03/21/25 1837)  Resp: 17 (03/21/25 2318)  BP: (!) 195/121 (03/21/25 1837)  SpO2: 97 % (03/21/25 1837) Vital Signs (24h Range):  Temp:  [99 °F (37.2 °C)] 99 °F (37.2 °C)  Pulse:  [131] 131  Resp:  [17-18] 17  SpO2:  [97 %] 97 %  BP: (195)/(121) 195/121     Weight: 83.9 kg (185 lb)  Body mass index is 27.32 kg/m².     Physical Exam  Vitals reviewed.   Constitutional:       General: He is awake. He is not in acute distress.     Appearance: Normal appearance. He is not ill-appearing or diaphoretic.   Cardiovascular:      Rate and Rhythm: Normal rate.      Heart sounds: Normal heart sounds. No murmur heard.     No friction rub. No gallop.      Comments: Trace BLE edema  Pulmonary:      Effort: Pulmonary effort is normal. No accessory  muscle usage or respiratory distress.      Breath sounds: Normal breath sounds. No wheezing, rhonchi or rales.   Abdominal:      General: Bowel sounds are increased.      Palpations: Abdomen is soft.      Tenderness: There is abdominal tenderness in the right lower quadrant and suprapubic area. There is no guarding or rebound. Positive signs include McBurney's sign.   Skin:     General: Skin is warm and dry.   Neurological:      Mental Status: He is alert and oriented to person, place, and time.   Psychiatric:         Behavior: Behavior is cooperative.                Significant Labs: All pertinent labs within the past 24 hours have been reviewed.  CBC:   Recent Labs   Lab 03/21/25 2108   WBC 19.88*   HGB 13.4*   HCT 41.6        CMP:   Recent Labs   Lab 03/21/25 2108      K 3.4*      CO2 29      BUN 14   CREATININE 1.7*   CALCIUM 9.7   PROT 7.8   ALBUMIN 4.1   BILITOT 0.9   ALKPHOS 72   AST 14   ALT 11   ANIONGAP 7*     Lipase:   Recent Labs   Lab 03/21/25  2108   LIPASE 16       Significant Imaging:   Imaging Results               CT Abdomen Pelvis With IV Contrast NO Oral Contrast (Final result)  Result time 03/21/25 22:39:21      Final result by Velia Crane MD (03/21/25 22:39:21)                   Impression:      1. Findings concerning for acute appendicitis with dilated/enlarged appendix with abnormal wall thickening/enhancement, adjacent inflammatory change and trace adjacent fluid in the right pericolic gutter.  No definite CT evidence to suggest associated perforation or focal fluid collection at this time.  Emergent surgical consultation advised.  2. Multiple enlarged right lower quadrant mesenteric lymph nodes with significant adjacent mesenteric fat stranding.  Findings could be reactive due to aforementioned process although clinical correlation advised.  3. Slight wall thickening of the terminal ileum, possibly reactive.  4. Small hiatal hernia.  Prominent/enlarged  paraesophageal/retrocrural lymph node measuring up to 1.0 cm of uncertain etiology/clinical significance.  Clinical correlation and further assessment advised with further assessment with endoscopy as clinically warranted/when clinically appropriate.  5. Additional findings as above.      Electronically signed by: Velia Crane MD  Date:    03/21/2025  Time:    22:39               Narrative:    EXAMINATION:  CT ABDOMEN PELVIS WITH IV CONTRAST    CLINICAL HISTORY:  RLQ abdominal pain (Age >= 14y);    TECHNIQUE:  Low dose axial images, sagittal and coronal reformations were obtained from the lung bases to the pubic symphysis following the IV administration of 100 mL of Omnipaque 350 .  No oral contrast.    COMPARISON:  None.    FINDINGS:  The visualized lung bases demonstrate dependent subsegmental bibasilar atelectasis.  No pleural fluid.  The heart appears slightly prominent in size.    The liver is normal in size and slightly hypoattenuating relative to the spleen which could relate to contrast bolus timing or hepatic steatosis.  Correlation with LFTs advised.  The main portal vein and splenic vein appear patent.  No calcified stones identified in the gallbladder lumen.  The spleen is unremarkable with an incidentally noted 1.5 cm splenule present.  The adrenal glands and pancreas are within normal limits.  No significant intra or extrahepatic biliary ductal dilatation.    The kidneys are normal in size and location enhance symmetrically.  No hydronephrosis.  The urinary bladder is within normal limits for its given degree of distention.  The prostate is unremarkable.    The abdominal aorta is nonaneurysmal.  Shotty periaortic lymph nodes are noted.    The appendix is enlarged/dilated measuring up to 1.5 cm with abnormal wall thickening and enhancement and abnormal adjacent inflammatory change with trace fluid extending into the right pericolic gutter.  These findings are concerning for acute appendicitis.  No  definite evidence of associated focal fluid collection or perforation.  There are multiple enlarged right lower quadrant lymph nodes present measuring up to 1.5 cm with significant adjacent inflammatory change.  Findings could be reactive due to aforementioned findings although clinical correlation recommended.  There is slight wall thickening of the terminal ileum which could be reactive.  The visualized loops of large and small bowel demonstrate no evidence of obstruction or inflammatory change.  Small hiatal hernia noted.  There is an enlarged paraesophageal/retrocrural node noted measuring 1.0 cm (axial series 3, image 36).  No free intraperitoneal air portal venous gas.    Visualized osseous structures are intact with degenerative change.  Very small fat containing umbilical hernia.    This report was flagged in Epic as abnormal.                                     Assessment/Plan:     * Appendicitis  - Patient presented to the ED with right lower quadrant abdominal pain and found to have findings concerning for appendicitis on CTAP  - General surgery consulted, appreciate recs  - Thus far has recommended IV antibiotics, maintenance fluids, NPO at midnight  - PRN analgesia  - Symptomatic care      Hypertension  Chronic, uncontrolled.  Latest blood pressure and vitals reviewed-     Temp:  [99 °F (37.2 °C)]   Pulse:  [131]   Resp:  [17-18]   BP: (195)/(121)   SpO2:  [97 %] .   Home meds for hypertension were reviewed and noted below-  Hypertension Medications              losartan (COZAAR) 25 MG tablet Take 25 mg by mouth once daily.    amLODIPine (NORVASC) 5 MG tablet Take 2 tablets (10 mg total) by mouth once daily.    hydroCHLOROthiazide (HYDRODIURIL) 12.5 MG Tab Take 1 tablet (12.5 mg total) by mouth once daily.            While in the hospital, will manage blood pressure as follows; Adjust home antihypertensive regimen as follows- holding home HCTZ and losartan setting of ANG, resume when appropriate    Will  utilize p.r.n. blood pressure medication only if patient's blood pressure greater than 180/110 and he develops symptoms such as worsening chest pain or shortness of breath.      Hypokalemia  Patient's most recent potassium results are listed below.   Recent Labs     03/21/25 2108   K 3.4*     Plan  - Replete potassium per protocol  - Monitor potassium Daily  - Patient's hypokalemia is stable  - Given potassium bicarb 20 mEq oral while in the ED    ANG (acute kidney injury)  ANG is likely due to pre-renal azotemia due to dehydration. Baseline creatinine is  1.1 . Most recent creatinine and eGFR are listed below.  Recent Labs     03/21/25 2108   CREATININE 1.7*   EGFRNORACEVR 50.7*      Plan  - ANG is stable  - Avoid nephrotoxins and renally dose meds for GFR listed above  - Monitor urine output, serial BMP, and adjust therapy as needed  -  ml/hr for 1 day, reassess need in AM  - Holding home hydrochlorothiazide and losartan in setting of ANG, resume when appropriate      VTE Risk Mitigation (From admission, onward)           Ordered     IP VTE LOW RISK PATIENT  Once         03/21/25 2334     Place sequential compression device  Until discontinued         03/21/25 2334                         On 03/21/2025, patient should be placed in hospital observation services under my care in collaboration with Sharla Villanueva MD.           Maribell Gonzlaes PA-C  Department of Hospital Medicine  [facility] - Emergency Dept

## 2025-03-22 NOTE — ASSESSMENT & PLAN NOTE
-MVI, thiamine and folic acid  -Monitor for signs and symptoms of withdrawal  - patient on cessation

## 2025-03-22 NOTE — ASSESSMENT & PLAN NOTE
In setting of acute appendicitis.  -IV fluids  -Avoid nephrotoxic agents  -Renally dose medications  -Monitor UOP and electrolytes  -Trend creatinine

## 2025-03-22 NOTE — ASSESSMENT & PLAN NOTE
Chronic, uncontrolled.  Latest blood pressure and vitals reviewed-     Temp:  [99 °F (37.2 °C)]   Pulse:  [131]   Resp:  [17-18]   BP: (195)/(121)   SpO2:  [97 %] .   Home meds for hypertension were reviewed and noted below-  Hypertension Medications              losartan (COZAAR) 25 MG tablet Take 25 mg by mouth once daily.    amLODIPine (NORVASC) 5 MG tablet Take 2 tablets (10 mg total) by mouth once daily.    hydroCHLOROthiazide (HYDRODIURIL) 12.5 MG Tab Take 1 tablet (12.5 mg total) by mouth once daily.            While in the hospital, will manage blood pressure as follows; Adjust home antihypertensive regimen as follows- holding home HCTZ and losartan setting of ANG, resume when appropriate    Will utilize p.r.n. blood pressure medication only if patient's blood pressure greater than 180/110 and he develops symptoms such as worsening chest pain or shortness of breath.

## 2025-03-22 NOTE — SUBJECTIVE & OBJECTIVE
"Interval History: see "Hospital Course"    Review of Systems   Gastrointestinal:  Positive for abdominal pain and nausea. Negative for vomiting.     Objective:     Vital Signs (Most Recent):  Temp: 99 °F (37.2 °C) (03/21/25 1837)  Pulse: 91 (03/22/25 0811)  Resp: 18 (03/22/25 0834)  BP: (!) 182/93 (03/22/25 0811)  SpO2: 96 % (03/22/25 0810) Vital Signs (24h Range):  Temp:  [99 °F (37.2 °C)] 99 °F (37.2 °C)  Pulse:  [] 91  Resp:  [17-18] 18  SpO2:  [92 %-97 %] 96 %  BP: (127-195)/() 182/93     Weight: 83.9 kg (185 lb)  Body mass index is 27.32 kg/m².    Intake/Output Summary (Last 24 hours) at 3/22/2025 0905  Last data filed at 3/22/2025 0439  Gross per 24 hour   Intake 0 ml   Output 400 ml   Net -400 ml         Physical Exam  Vitals and nursing note reviewed.   Constitutional:       General: He is not in acute distress.  HENT:      Head: Normocephalic and atraumatic.      Right Ear: External ear normal.      Left Ear: External ear normal.      Nose: Nose normal.      Mouth/Throat:      Mouth: Mucous membranes are moist.      Pharynx: No oropharyngeal exudate.   Eyes:      Extraocular Movements: Extraocular movements intact.      Conjunctiva/sclera: Conjunctivae normal.   Cardiovascular:      Rate and Rhythm: Normal rate and regular rhythm.      Pulses: Normal pulses.      Heart sounds: Normal heart sounds.   Pulmonary:      Effort: Pulmonary effort is normal.      Breath sounds: Normal breath sounds.      Comments: RA.  Abdominal:      General: There is no distension.      Tenderness: There is abdominal tenderness.   Musculoskeletal:         General: Normal range of motion.      Cervical back: Normal range of motion and neck supple.      Right lower leg: No edema.      Left lower leg: No edema.   Skin:     General: Skin is warm and dry.   Neurological:      Mental Status: He is alert. Mental status is at baseline.   Psychiatric:         Mood and Affect: Mood normal.         Behavior: Behavior normal. "               Significant Labs: All pertinent labs within the past 24 hours have been reviewed.    Significant Imaging: I have reviewed all pertinent imaging results/findings within the past 24 hours.

## 2025-03-22 NOTE — ANESTHESIA POSTPROCEDURE EVALUATION
Anesthesia Post Evaluation    Patient: Rodolfo Rutledge    Procedure(s) Performed: Procedure(s) (LRB):  APPENDECTOMY, LAPAROSCOPIC (N/A)    Final Anesthesia Type: general      Patient location during evaluation: PACU  Patient participation: Yes- Able to Participate  Level of consciousness: awake and alert, oriented and awake  Post-procedure vital signs: reviewed and stable  Pain management: adequate  Airway patency: patent    PONV status at discharge: No PONV  Anesthetic complications: no      Cardiovascular status: blood pressure returned to baseline, hemodynamically stable and stable  Respiratory status: unassisted, spontaneous ventilation and room air  Hydration status: euvolemic  Follow-up not needed.              Vitals Value Taken Time   /92 03/22/25 15:38   Temp 36.6 °C (97.9 °F) 03/22/25 15:05   Pulse 88 03/22/25 15:44   Resp 20 03/22/25 15:44   SpO2 100 % 03/22/25 15:44   Vitals shown include unfiled device data.      No case tracking events are documented in the log.      Pain/Nicolasa Score: Pain Rating Prior to Med Admin: 7 (3/22/2025  3:25 PM)  Pain Rating Post Med Admin: 7 (3/21/2025 11:56 PM)  Nicolasa Score: 10 (3/22/2025  3:30 PM)

## 2025-03-22 NOTE — ASSESSMENT & PLAN NOTE
Patient's most recent potassium results are listed below.   Recent Labs     03/21/25  2108   K 3.4*     Plan  - Replete potassium per protocol  - Monitor potassium Daily  - Patient's hypokalemia is stable  - Given potassium bicarb 20 mEq oral while in the ED

## 2025-03-22 NOTE — HPI
Mr. Rutledge is a 44 yr old male with a hx of hypertension who presented to the ED with a chief complaint of abdominal pain.  Patient endorses 10+/10 constant right lower quadrant abdominal pain described as achy and sore that began yesterday.  He states the pain radiated all the way across his lower abdomen to his left lower quadrant.  He also endorses some nausea earlier on this week.  He states he took a pain medication that his mom gave him at home with minimal relief.  He endorses that the pain medication in the ED has masked the pain.  He smokes cigars in the weekends, drinks alcohol on the weekends, and denies recreational drug use.  He denies fever, chills, dyspnea, chest pain, vomiting, diarrhea, dysuria, hematuria, lightheadedness, dizziness, and syncope.    Upon arrival to ED, patient afebrile, HR of 131, RR of 18, BP of 195/121, satting 97% on RA.  Workup in the ED revealed WBC of 19.88, hemoglobin of 13.4, potassium of 3.4, creatinine 1.7, GFR of 50.7.  Remainder of labs fairly unremarkable.  CTAP shows findings concerning for acute appendicitis with dilated/enlarged appendix with abnormal wall thickening/enhancement, adjacent inflammatory change and trace adjacent fluid in the right pericolic gutter.  No definite CT evidence to suggest associated perforation or focal fluid collection at this time.  Multiple enlarged right lower quadrant mesenteric lymph nodes with significant adjacent mesenteric fat stranding.  Findings could be reactive due to the aforementioned process although clinical correlation advised.  Slight wall thickening of the terminal ileum, possibly reactive.  Small hiatal hernia.  Prominent/enlarged paraesophageal/retrocrural lymph node measuring up to 1 cm uncertain etiology/clinical significance.  Patient was given hydromorphone 0.5 mg IV x2,  mL bolus, Mag oxide 400 mg oral, ondansetron 4 mg IV, Zosyn 4.5 g IV, potassium bicarb 20 mEq oral while in the ED. ED provider discussed  case with General surgery Dr. Grayson who recommended IV antibiotics, maintenance fluids, NPO at midnight.

## 2025-03-22 NOTE — ED NOTES
Bed: [place] [address]  Expected date:   Expected time:   Means of arrival:   Comments:  Ramon padilla

## 2025-03-22 NOTE — ASSESSMENT & PLAN NOTE
ANG is likely due to pre-renal azotemia due to dehydration. Baseline creatinine is  1.1 . Most recent creatinine and eGFR are listed below.  Recent Labs     03/21/25 2108   CREATININE 1.7*   EGFRNORACEVR 50.7*      Plan  - ANG is stable  - Avoid nephrotoxins and renally dose meds for GFR listed above  - Monitor urine output, serial BMP, and adjust therapy as needed  -  ml/hr for 1 day, reassess need in AM  - Holding home hydrochlorothiazide and losartan in setting of ANG, resume when appropriate

## 2025-03-23 VITALS
HEIGHT: 69 IN | DIASTOLIC BLOOD PRESSURE: 112 MMHG | SYSTOLIC BLOOD PRESSURE: 175 MMHG | OXYGEN SATURATION: 97 % | HEART RATE: 89 BPM | WEIGHT: 185 LBS | BODY MASS INDEX: 27.4 KG/M2 | TEMPERATURE: 99 F | RESPIRATION RATE: 18 BRPM

## 2025-03-23 PROBLEM — K37 APPENDICITIS: Status: RESOLVED | Noted: 2025-03-21 | Resolved: 2025-03-23

## 2025-03-23 PROBLEM — Z90.49 S/P LAPAROSCOPIC APPENDECTOMY: Status: ACTIVE | Noted: 2025-03-23

## 2025-03-23 PROBLEM — E87.6 HYPOKALEMIA: Status: RESOLVED | Noted: 2025-03-21 | Resolved: 2025-03-23

## 2025-03-23 PROBLEM — N17.9 AKI (ACUTE KIDNEY INJURY): Status: RESOLVED | Noted: 2025-03-21 | Resolved: 2025-03-23

## 2025-03-23 LAB
ABSOLUTE EOSINOPHIL (SMH): 0.06 K/UL
ABSOLUTE MONOCYTE (SMH): 0.63 K/UL (ref 0.3–1)
ABSOLUTE NEUTROPHIL COUNT (SMH): 6.2 K/UL (ref 1.8–7.7)
ANION GAP (SMH): 8 MMOL/L (ref 8–16)
BASOPHILS # BLD AUTO: 0.02 K/UL
BASOPHILS NFR BLD AUTO: 0.2 %
BUN SERPL-MCNC: 13 MG/DL (ref 6–20)
CALCIUM SERPL-MCNC: 8.9 MG/DL (ref 8.7–10.5)
CHLORIDE SERPL-SCNC: 102 MMOL/L (ref 95–110)
CO2 SERPL-SCNC: 26 MMOL/L (ref 23–29)
CREAT SERPL-MCNC: 1.4 MG/DL (ref 0.5–1.4)
ERYTHROCYTE [DISTWIDTH] IN BLOOD BY AUTOMATED COUNT: 15.9 % (ref 11.5–14.5)
GFR SERPLBLD CREATININE-BSD FMLA CKD-EPI: >60 ML/MIN/1.73/M2
GLUCOSE SERPL-MCNC: 112 MG/DL (ref 70–110)
HCT VFR BLD AUTO: 37.5 % (ref 40–54)
HGB BLD-MCNC: 12.1 GM/DL (ref 14–18)
IMM GRANULOCYTES # BLD AUTO: 0.02 K/UL (ref 0–0.04)
IMM GRANULOCYTES NFR BLD AUTO: 0.2 % (ref 0–0.5)
LYMPHOCYTES # BLD AUTO: 1.51 K/UL (ref 1–4.8)
MCH RBC QN AUTO: 27.4 PG (ref 27–31)
MCHC RBC AUTO-ENTMCNC: 32.3 G/DL (ref 32–36)
MCV RBC AUTO: 85 FL (ref 82–98)
NUCLEATED RBC (/100WBC) (SMH): 0 /100 WBC
PLATELET # BLD AUTO: 222 K/UL (ref 150–450)
PMV BLD AUTO: 10.4 FL (ref 9.2–12.9)
POTASSIUM SERPL-SCNC: 3.6 MMOL/L (ref 3.5–5.1)
RBC # BLD AUTO: 4.41 M/UL (ref 4.6–6.2)
RELATIVE EOSINOPHIL (SMH): 0.7 % (ref 0–8)
RELATIVE LYMPHOCYTE (SMH): 18 % (ref 18–48)
RELATIVE MONOCYTE (SMH): 7.5 % (ref 4–15)
RELATIVE NEUTROPHIL (SMH): 73.4 % (ref 38–73)
SODIUM SERPL-SCNC: 136 MMOL/L (ref 136–145)
WBC # BLD AUTO: 8.39 K/UL (ref 3.9–12.7)

## 2025-03-23 PROCEDURE — 80048 BASIC METABOLIC PNL TOTAL CA: CPT | Performed by: STUDENT IN AN ORGANIZED HEALTH CARE EDUCATION/TRAINING PROGRAM

## 2025-03-23 PROCEDURE — G0378 HOSPITAL OBSERVATION PER HR: HCPCS

## 2025-03-23 PROCEDURE — 25000003 PHARM REV CODE 250: Performed by: STUDENT IN AN ORGANIZED HEALTH CARE EDUCATION/TRAINING PROGRAM

## 2025-03-23 PROCEDURE — 63600175 PHARM REV CODE 636 W HCPCS: Mod: TB,JZ | Performed by: INTERNAL MEDICINE

## 2025-03-23 PROCEDURE — 25000003 PHARM REV CODE 250

## 2025-03-23 PROCEDURE — 85025 COMPLETE CBC W/AUTO DIFF WBC: CPT | Performed by: STUDENT IN AN ORGANIZED HEALTH CARE EDUCATION/TRAINING PROGRAM

## 2025-03-23 PROCEDURE — 25000003 PHARM REV CODE 250: Performed by: INTERNAL MEDICINE

## 2025-03-23 PROCEDURE — 63600175 PHARM REV CODE 636 W HCPCS

## 2025-03-23 PROCEDURE — 96376 TX/PRO/DX INJ SAME DRUG ADON: CPT

## 2025-03-23 PROCEDURE — 36415 COLL VENOUS BLD VENIPUNCTURE: CPT | Performed by: STUDENT IN AN ORGANIZED HEALTH CARE EDUCATION/TRAINING PROGRAM

## 2025-03-23 PROCEDURE — 63600175 PHARM REV CODE 636 W HCPCS: Mod: TB,JZ | Performed by: STUDENT IN AN ORGANIZED HEALTH CARE EDUCATION/TRAINING PROGRAM

## 2025-03-23 RX ORDER — LOSARTAN POTASSIUM 25 MG/1
25 TABLET ORAL DAILY
Status: DISCONTINUED | OUTPATIENT
Start: 2025-03-23 | End: 2025-03-23 | Stop reason: HOSPADM

## 2025-03-23 RX ORDER — HYDROMORPHONE HYDROCHLORIDE 1 MG/ML
0.5 INJECTION, SOLUTION INTRAMUSCULAR; INTRAVENOUS; SUBCUTANEOUS
Status: DISCONTINUED | OUTPATIENT
Start: 2025-03-23 | End: 2025-03-23 | Stop reason: HOSPADM

## 2025-03-23 RX ORDER — HYDROCHLOROTHIAZIDE 12.5 MG/1
12.5 TABLET ORAL DAILY
Status: DISCONTINUED | OUTPATIENT
Start: 2025-03-23 | End: 2025-03-23 | Stop reason: HOSPADM

## 2025-03-23 RX ORDER — HYDROCODONE BITARTRATE AND ACETAMINOPHEN 10; 325 MG/1; MG/1
1 TABLET ORAL EVERY 6 HOURS PRN
Refills: 0 | Status: DISCONTINUED | OUTPATIENT
Start: 2025-03-23 | End: 2025-03-23 | Stop reason: HOSPADM

## 2025-03-23 RX ORDER — HYDROCODONE BITARTRATE AND ACETAMINOPHEN 10; 325 MG/1; MG/1
1 TABLET ORAL EVERY 6 HOURS PRN
Qty: 20 TABLET | Refills: 0 | Status: SHIPPED | OUTPATIENT
Start: 2025-03-23 | End: 2025-03-28

## 2025-03-23 RX ORDER — PANTOPRAZOLE SODIUM 20 MG/1
20 TABLET, DELAYED RELEASE ORAL DAILY
Status: DISCONTINUED | OUTPATIENT
Start: 2025-03-23 | End: 2025-03-23 | Stop reason: HOSPADM

## 2025-03-23 RX ADMIN — PIPERACILLIN SODIUM AND TAZOBACTAM SODIUM 3.38 G: 3; .375 INJECTION, POWDER, FOR SOLUTION INTRAVENOUS at 04:03

## 2025-03-23 RX ADMIN — HYDROCODONE BITARTRATE AND ACETAMINOPHEN 1 TABLET: 10; 325 TABLET ORAL at 09:03

## 2025-03-23 RX ADMIN — FOLIC ACID 1 MG: 1 TABLET ORAL at 07:03

## 2025-03-23 RX ADMIN — HYDROMORPHONE HYDROCHLORIDE 1 MG: 1 INJECTION, SOLUTION INTRAMUSCULAR; INTRAVENOUS; SUBCUTANEOUS at 04:03

## 2025-03-23 RX ADMIN — HYDROCODONE BITARTRATE AND ACETAMINOPHEN 1 TABLET: 5; 325 TABLET ORAL at 04:03

## 2025-03-23 RX ADMIN — HYDROMORPHONE HYDROCHLORIDE 0.5 MG: 1 INJECTION, SOLUTION INTRAMUSCULAR; INTRAVENOUS; SUBCUTANEOUS at 10:03

## 2025-03-23 RX ADMIN — THIAMINE HCL TAB 100 MG 100 MG: 100 TAB at 07:03

## 2025-03-23 RX ADMIN — AMLODIPINE BESYLATE 10 MG: 5 TABLET ORAL at 07:03

## 2025-03-23 RX ADMIN — THERA TABS 1 TABLET: TAB at 07:03

## 2025-03-23 RX ADMIN — HYDROMORPHONE HYDROCHLORIDE 1 MG: 1 INJECTION, SOLUTION INTRAMUSCULAR; INTRAVENOUS; SUBCUTANEOUS at 12:03

## 2025-03-23 RX ADMIN — HYDROCHLOROTHIAZIDE 12.5 MG: 12.5 TABLET ORAL at 08:03

## 2025-03-23 RX ADMIN — PANTOPRAZOLE SODIUM 40 MG: 40 INJECTION, POWDER, FOR SOLUTION INTRAVENOUS at 07:03

## 2025-03-23 RX ADMIN — LOSARTAN POTASSIUM 25 MG: 25 TABLET, FILM COATED ORAL at 08:03

## 2025-03-23 NOTE — DISCHARGE SUMMARY
[facility]  Discharge Summary      Patient Name: Rodolfo Rutledge  MRN: [id number]  [facility]: [id number]  Patient Class: OP- Observation  Admission Date: 3/21/2025  Hospital Length of Stay: 0 days  Discharge Date and Time: No discharge date for patient encounter.  Attending Physician: Noe Mccray MD   Discharging Provider: Noe Mccray MD  Primary Care Provider: Zaid Bridges MD    Primary Care Team: Networked reference to record PCT     HPI:   Mr. Rutledge is a 44 yr old male with a hx of hypertension who presented to the ED with a chief complaint of abdominal pain.  Patient endorses 10+/10 constant right lower quadrant abdominal pain described as achy and sore that began yesterday.  He states the pain radiated all the way across his lower abdomen to his left lower quadrant.  He also endorses some nausea earlier on this week.  He states he took a pain medication that his mom gave him at home with minimal relief.  He endorses that the pain medication in the ED has masked the pain.  He smokes cigars in the weekends, drinks alcohol on the weekends, and denies recreational drug use.  He denies fever, chills, dyspnea, chest pain, vomiting, diarrhea, dysuria, hematuria, lightheadedness, dizziness, and syncope.    Upon arrival to ED, patient afebrile, HR of 131, RR of 18, BP of 195/121, satting 97% on RA.  Workup in the ED revealed WBC of 19.88, hemoglobin of 13.4, potassium of 3.4, creatinine 1.7, GFR of 50.7.  Remainder of labs fairly unremarkable.  CTAP shows findings concerning for acute appendicitis with dilated/enlarged appendix with abnormal wall thickening/enhancement, adjacent inflammatory change and trace adjacent fluid in the right pericolic gutter.  No definite CT evidence to suggest associated perforation or focal fluid collection at this time.  Multiple enlarged right lower quadrant mesenteric lymph nodes with significant adjacent mesenteric fat stranding.  Findings  could be reactive due to the aforementioned process although clinical correlation advised.  Slight wall thickening of the terminal ileum, possibly reactive.  Small hiatal hernia.  Prominent/enlarged paraesophageal/retrocrural lymph node measuring up to 1 cm uncertain etiology/clinical significance.  Patient was given hydromorphone 0.5 mg IV x2,  mL bolus, Mag oxide 400 mg oral, ondansetron 4 mg IV, Zosyn 4.5 g IV, potassium bicarb 20 mEq oral while in the ED. ED provider discussed case with General surgery Dr. Grayson who recommended IV antibiotics, maintenance fluids, NPO at midnight.    Procedure(s) (LRB):  APPENDECTOMY, LAPAROSCOPIC (N/A)      Hospital Course:   Rodolfo Rutledge is a 44 year old male with a past medical history of EtOH and tobacco dependence, HTN, GERD, and anemia who presented with acute appendicitis. He was on IV fluids and Zosyn. PRN analgesics and antiemetics were ordered. Surgery performed laparoscopic appendectomy 3/22 which he tolerated without complication. His course was complicated by ANG and elevated BP. He was able to tolerate a PO diet and was discharged 3/23. He will follow up with Surgery.     Goals of Care Treatment Preferences:  Code Status: Full Code         Consults:   Consults (From admission, onward)          Status Ordering Provider     Inpatient consult to General Surgery  Once        Provider:  Jerrod Grayson MD    Acknowledged MICHELLE ARRIOLA            Assessment & Plan  Hypertension  -Amlodipine 10  -HCTZ  -Losartan  -PRN IV hydralazine  -Continue to monitor    EtOH dependence  -MVI, thiamine and folic acid  -Monitor for signs and symptoms of withdrawal  - patient on cessation    Tobacco dependence  -Patient to be counseled on cessation  GERD (gastroesophageal reflux disease)  -PPI    Anemia  Stable. Chronic.  -Trend Hgb with CBC  S/P laparoscopic appendectomy  -PRN analgesics  -Diet as tolerated  -Follow up with Surgery    Final Active Diagnoses:     Diagnosis Date Noted POA    PRINCIPAL PROBLEM:  S/P laparoscopic appendectomy [Z90.49] 03/23/2025 Not Applicable    EtOH dependence [F10.20] 03/22/2025 Yes    Tobacco dependence [F17.200] 03/22/2025 Yes    GERD (gastroesophageal reflux disease) [K21.9] 03/22/2025 Yes    Anemia [D64.9] 03/22/2025 Yes    Hypertension [I10] 03/21/2025 Yes      Problems Resolved During this Admission:    Diagnosis Date Noted Date Resolved POA    ANG (acute kidney injury) [N17.9] 03/21/2025 03/23/2025 Yes    Appendicitis [K37] 03/21/2025 03/23/2025 Yes    Hypokalemia [E87.6] 03/21/2025 03/23/2025 Yes       Discharged Condition: stable    Disposition: Home or Self Care    Follow Up:   Follow-up Information       Jerrod Grayson MD Follow up in 2 week(s).    Specialties: General Surgery, Colon and Rectal Surgery, Surgery  Contact information:  [address] [address]  [phone]                           Patient Instructions:      Diet Cardiac     Notify your health care provider if you experience any of the following:  increased confusion or weakness     Notify your health care provider if you experience any of the following:  persistent dizziness, light-headedness, or visual disturbances     Notify your health care provider if you experience any of the following:  severe persistent headache     Notify your health care provider if you experience any of the following:  difficulty breathing or increased cough     Notify your health care provider if you experience any of the following:  severe uncontrolled pain     Notify your health care provider if you experience any of the following:  persistent nausea and vomiting or diarrhea     Notify your health care provider if you experience any of the following:  temperature >100.4     Notify your health care provider if you experience any of the following:  redness, tenderness, or signs of infection (pain, swelling, redness, odor or green/yellow discharge around incision  site)     Notify your health care provider if you experience any of the following:  worsening rash     Activity as tolerated       Significant Diagnostic Studies: Labs: All labs within the past 24 hours have been reviewed    Pending Diagnostic Studies:       Procedure Component Value Units Date/Time    Basic Metabolic Panel [[id number]]     Order Status: Sent Lab Status: No result     Specimen: Blood     CBC Auto Differential [[id number]]     Order Status: Sent Lab Status: No result     Specimen: Blood     Narrative:      The following orders were created for panel order CBC Auto Differential.  Procedure                               Abnormality         Status                     ---------                               -----------         ------                     CBC with Differential[[id number]]                                                        Please view results for these tests on the individual orders.    CBC with Differential [[id number]]     Order Status: Sent Lab Status: No result     Specimen: Blood     Specimen to Pathology - Surgery [[id number]] Collected: 03/22/25 1427    Order Status: Sent Lab Status: No result     Specimen: Tissue     Urinalysis, Reflex to Urine Culture Urine, Clean Catch [[id number]] Collected: 03/21/25 2255    Order Status: Sent Lab Status: In process Updated: 03/21/25 2317    Specimen: Urine, Clean Catch            Medications:  Reconciled Home Medications:      Medication List        START taking these medications      HYDROcodone-acetaminophen  mg per tablet  Commonly known as: NORCO  Take 1 tablet by mouth every 6 (six) hours as needed for Pain.            CHANGE how you take these medications      mupirocin 2 % ointment  Commonly known as: BACTROBAN  Apply topically 3 (three) times daily.  What changed: how much to take            CONTINUE taking these medications      acetaminophen 500 MG tablet  Commonly known as: TYLENOL  Take 1 tablet (500 mg total) by mouth every  6 (six) hours as needed for Temperature greater than or Pain.     amLODIPine 5 MG tablet  Commonly known as: NORVASC  Take 2 tablets (10 mg total) by mouth once daily.     hydroCHLOROthiazide 12.5 MG Tab  Take 1 tablet (12.5 mg total) by mouth once daily.     ibuprofen 800 MG tablet  Commonly known as: ADVIL,MOTRIN  Take 1 tablet (800 mg total) by mouth every 8 (eight) hours as needed for Pain.     losartan 25 MG tablet  Commonly known as: COZAAR  Take 25 mg by mouth once daily.     methocarbamoL 500 MG Tab  Commonly known as: Robaxin  Take 2 tablets (1,000 mg total) by mouth 3 (three) times daily as needed (Muscle pain/spasms).     pantoprazole 20 MG tablet  Commonly known as: PROTONIX  Take 1 tablet (20 mg total) by mouth once daily. for 14 days              Indwelling Lines/Drains at time of discharge:   Lines/Drains/Airways       None                   Time spent on the discharge of patient: 32 minutes         Noe Mccray MD  Department of Hospital Medicine  [facility]

## 2025-03-23 NOTE — PLAN OF CARE
Patient refused IV hydralazine to lower BP prior to discharge. Will continue home BP medications and take PRN analgesics for pain control. Will be seen by Surgery in outpatient setting. May need further BP medication titration in the future is remains elevated.

## 2025-03-23 NOTE — NURSING
Pt wanted to leave but was trying to lower his BP. Was threatening to walk out. Tried to talk him into taking the IV hydralazine and waiting 15-30 minutes for a BP recheck. Pt refused. Dr Mccray notified. Ok to dc pt. IV and tele box removed. Pt refused wheelchair, choose to ambulate down to private vehicle.

## 2025-03-23 NOTE — PLAN OF CARE
[facility]  Initial Discharge Assessment       Primary Care Provider: Zaid Bridges MD    Admission Diagnosis: Appendicitis [K37]    Admission Date: 3/21/2025  Expected Discharge Date: 3/23/2025    Pt is a 45-year-old male who arrived from home with S/P laparoscopic appendectomy. Information verified as correct on facesheet. The assessment was completed at the patient's bedside.  Pt lives alone. Pt does not have a Living Will or Advance Directive. The Pt is oriented to person, places, and times. PCP last seen last year.  Pt denies Coumadin, dialysis, DME, HH, and has not been readmitted into the hospital in the last thirty days.  Pt is capable of performing ADLs without assistance. Pt drives to and from medical appointments. Pt reports he takes medication as prescribed; pharmacy used [facility].  Pt verbalized plan to discharge home via family transport. Pt has no other needs to be addressed at this time. CM reviewed the chart and will continue to monitor.     Transition of Care Barriers: None    Payor: MEDICAID / Plan: [place] [employer] CONNECT / Product Type: Managed Medicaid /     Extended Emergency Contact Information  Primary Emergency Contact: Devin Rutledgeia  Mobile Phone: [phone]  Relation: Mother  Preferred language: English   needed? No    Discharge Plan A: Home  Discharge Plan B: Home      [id number] - [place] - [address] [facility] [address]  [address] [address]  Phone: [phone] Fax: [phone]      Initial Assessment (most recent)       Adult Discharge Assessment - 03/23/25 0955          Discharge Assessment    Assessment Type Discharge Planning Assessment     Confirmed/corrected address, phone number and insurance Yes     Confirmed Demographics Correct on Facesheet     Source of Information patient     When was your last doctors appointment? --   PCP last seen last year, Pt has upcoming appointment  on 3/28/2025.    Does patient/caregiver understand observation status Yes     Reason For Admission S/P laparoscopic appendectomy     People in Home alone     Facility Arrived From: home     Do you expect to return to your current living situation? Yes     Do you have help at home or someone to help you manage your care at home? No     Prior to hospitilization cognitive status: Alert/Oriented     Current cognitive status: Alert/Oriented     Walking or Climbing Stairs Difficulty no     Dressing/Bathing Difficulty no     Home Accessibility not wheelchair accessible     Equipment Currently Used at Home none     Readmission within 30 days? No     Patient currently being followed by outpatient case management? No     Do you currently have service(s) that help you manage your care at home? No     Do you take prescription medications? Yes     Do you have prescription coverage? Yes     Coverage Payor: MEDICAID - [facility] CONNECT -     Do you have any problems affording any of your prescribed medications? No     Is the patient taking medications as prescribed? yes     Who is going to help you get home at discharge? AamirTosin (Mother)  [phone]     How do you get to doctors appointments? car, drives self     Are you on dialysis? No     Discharge Plan A Home     Discharge Plan B Home     DME Needed Upon Discharge  none     Discharge Plan discussed with: Patient     Transition of Care Barriers None

## 2025-03-23 NOTE — PLAN OF CARE
Problem: Adult Inpatient Plan of Care  Goal: Plan of Care Review  Outcome: Met  Goal: Patient-Specific Goal (Individualized)  Outcome: Met  Goal: Absence of Hospital-Acquired Illness or Injury  Outcome: Met  Goal: Optimal Comfort and Wellbeing  Outcome: Met  Goal: Readiness for Transition of Care  Outcome: Met     Problem: Acute Kidney Injury/Impairment  Goal: Fluid and Electrolyte Balance  Outcome: Met  Goal: Improved Oral Intake  Outcome: Met  Goal: Effective Renal Function  Outcome: Met     Problem: Wound  Goal: Optimal Coping  Outcome: Met  Goal: Optimal Functional Ability  Outcome: Met  Goal: Absence of Infection Signs and Symptoms  Outcome: Met  Goal: Improved Oral Intake  Outcome: Met  Goal: Optimal Pain Control and Function  Outcome: Met  Goal: Skin Health and Integrity  Outcome: Met  Goal: Optimal Wound Healing  Outcome: Met

## 2025-03-27 NOTE — PLAN OF CARE
03/27/25 0903   Final Note   Assessment Type Final Discharge Note   Anticipated Discharge Disposition Home   Post-Acute Status   Discharge Delays None known at this time

## (undated) DEVICE — SOL IRRI STRL WATER 1000ML

## (undated) DEVICE — TRAY GENERAL LAPAROSCOPY SMH

## (undated) DEVICE — IRRIGATOR SUCTION W/TIP

## (undated) DEVICE — SUT MONOCRYL 4-0 PS-2

## (undated) DEVICE — SOL NACL IRR 3000ML

## (undated) DEVICE — STAPLER INT LINEAR ARTC 3.5-45

## (undated) DEVICE — COVER CAMERA OPERATING ROOM

## (undated) DEVICE — SYS SEE SHARP SCP ANTIFG LNG

## (undated) DEVICE — ELECTRODE REM PLYHSV RETURN 9

## (undated) DEVICE — SUT VICRYL+ 27 UR-6 VIOL

## (undated) DEVICE — NDL PNEUMOPERITONEUM 150MM

## (undated) DEVICE — GLOVE SENSICARE PI SURG 7.5

## (undated) DEVICE — CART STAPLE FLEX ETX 3.5MM BLU

## (undated) DEVICE — BAG TISS RETRV MONARCH 10MM

## (undated) DEVICE — CART STAPLE RELD 45MM WHT

## (undated) DEVICE — CANNULA ENDOPATH XCEL 5X100MM

## (undated) DEVICE — TRAY CATH 1-LYR URIMTR 16FR

## (undated) DEVICE — DISSECTOR CURVED 5DCD

## (undated) DEVICE — COVER LIGHT HANDLE 80/CA

## (undated) DEVICE — TROCAR KII FIOS ZTHREAD 12X100